# Patient Record
Sex: MALE | Race: WHITE | NOT HISPANIC OR LATINO | Employment: OTHER | URBAN - METROPOLITAN AREA
[De-identification: names, ages, dates, MRNs, and addresses within clinical notes are randomized per-mention and may not be internally consistent; named-entity substitution may affect disease eponyms.]

---

## 2023-05-19 LAB
ANION GAP IN SER/PLAS: 11 MMOL/L (ref 10–20)
CALCIUM (MG/DL) IN SER/PLAS: 8.8 MG/DL (ref 8.6–10.3)
CARBON DIOXIDE, TOTAL (MMOL/L) IN SER/PLAS: 28 MMOL/L (ref 21–32)
CHLORIDE (MMOL/L) IN SER/PLAS: 105 MMOL/L (ref 98–107)
CREATININE (MG/DL) IN SER/PLAS: 0.95 MG/DL (ref 0.5–1.3)
GFR MALE: 86 ML/MIN/1.73M2
GLUCOSE (MG/DL) IN SER/PLAS: 103 MG/DL (ref 74–99)
MAGNESIUM (MG/DL) IN SER/PLAS: 2.04 MG/DL (ref 1.6–2.4)
POTASSIUM (MMOL/L) IN SER/PLAS: 4.4 MMOL/L (ref 3.5–5.3)
SODIUM (MMOL/L) IN SER/PLAS: 140 MMOL/L (ref 136–145)
UREA NITROGEN (MG/DL) IN SER/PLAS: 11 MG/DL (ref 6–23)

## 2023-06-08 ENCOUNTER — APPOINTMENT (OUTPATIENT)
Dept: PRIMARY CARE | Facility: CLINIC | Age: 73
End: 2023-06-08
Payer: MEDICARE

## 2023-06-21 ENCOUNTER — OFFICE VISIT (OUTPATIENT)
Dept: PRIMARY CARE | Facility: CLINIC | Age: 73
End: 2023-06-21
Payer: MEDICARE

## 2023-06-21 VITALS
BODY MASS INDEX: 30.75 KG/M2 | DIASTOLIC BLOOD PRESSURE: 74 MMHG | WEIGHT: 246 LBS | HEART RATE: 73 BPM | OXYGEN SATURATION: 95 % | SYSTOLIC BLOOD PRESSURE: 122 MMHG

## 2023-06-21 DIAGNOSIS — I48.20 CHRONIC ATRIAL FIBRILLATION, UNSPECIFIED (MULTI): ICD-10-CM

## 2023-06-21 DIAGNOSIS — Z12.12 SCREENING FOR COLORECTAL CANCER: ICD-10-CM

## 2023-06-21 DIAGNOSIS — Z12.11 SCREENING FOR COLORECTAL CANCER: ICD-10-CM

## 2023-06-21 DIAGNOSIS — I10 PRIMARY HYPERTENSION: ICD-10-CM

## 2023-06-21 DIAGNOSIS — I20.89 OTHER FORMS OF ANGINA PECTORIS (CMS-HCC): ICD-10-CM

## 2023-06-21 DIAGNOSIS — Z00.00 ROUTINE GENERAL MEDICAL EXAMINATION AT HEALTH CARE FACILITY: ICD-10-CM

## 2023-06-21 DIAGNOSIS — I50.32 CHRONIC DIASTOLIC (CONGESTIVE) HEART FAILURE (MULTI): ICD-10-CM

## 2023-06-21 DIAGNOSIS — Z00.00 MEDICARE ANNUAL WELLNESS VISIT, SUBSEQUENT: Primary | ICD-10-CM

## 2023-06-21 PROBLEM — I48.91 ATRIAL FIBRILLATION (MULTI): Status: ACTIVE | Noted: 2023-06-21

## 2023-06-21 PROBLEM — N42.82 PROSTATITIS SYNDROME: Status: ACTIVE | Noted: 2023-06-21

## 2023-06-21 PROBLEM — N28.1 RENAL CYST, ACQUIRED, LEFT: Status: ACTIVE | Noted: 2023-06-21

## 2023-06-21 PROBLEM — N40.0 ENLARGED PROSTATE WITHOUT LOWER URINARY TRACT SYMPTOMS (LUTS): Status: ACTIVE | Noted: 2023-06-21

## 2023-06-21 PROCEDURE — 1160F RVW MEDS BY RX/DR IN RCRD: CPT | Performed by: NURSE PRACTITIONER

## 2023-06-21 PROCEDURE — 3074F SYST BP LT 130 MM HG: CPT | Performed by: NURSE PRACTITIONER

## 2023-06-21 PROCEDURE — 99213 OFFICE O/P EST LOW 20 MIN: CPT | Performed by: NURSE PRACTITIONER

## 2023-06-21 PROCEDURE — 1036F TOBACCO NON-USER: CPT | Performed by: NURSE PRACTITIONER

## 2023-06-21 PROCEDURE — 1159F MED LIST DOCD IN RCRD: CPT | Performed by: NURSE PRACTITIONER

## 2023-06-21 PROCEDURE — 3078F DIAST BP <80 MM HG: CPT | Performed by: NURSE PRACTITIONER

## 2023-06-21 PROCEDURE — G0439 PPPS, SUBSEQ VISIT: HCPCS | Performed by: NURSE PRACTITIONER

## 2023-06-21 PROCEDURE — 1170F FXNL STATUS ASSESSED: CPT | Performed by: NURSE PRACTITIONER

## 2023-06-21 RX ORDER — DOFETILIDE 0.5 MG/1
CAPSULE ORAL
COMMUNITY
End: 2024-04-09 | Stop reason: HOSPADM

## 2023-06-21 RX ORDER — CARVEDILOL 25 MG/1
TABLET ORAL
COMMUNITY
End: 2024-04-09 | Stop reason: HOSPADM

## 2023-06-21 RX ORDER — AMLODIPINE BESYLATE 10 MG/1
TABLET ORAL
COMMUNITY
Start: 2023-06-19 | End: 2024-01-29 | Stop reason: SDUPTHER

## 2023-06-21 RX ORDER — SACUBITRIL AND VALSARTAN 97; 103 MG/1; MG/1
1 TABLET, FILM COATED ORAL 2 TIMES DAILY
COMMUNITY
Start: 2021-12-07

## 2023-06-21 RX ORDER — TAMSULOSIN HYDROCHLORIDE 0.4 MG/1
CAPSULE ORAL
COMMUNITY
Start: 2012-09-25 | End: 2023-11-27

## 2023-06-21 RX ORDER — APIXABAN 5 MG/1
1 TABLET, FILM COATED ORAL 2 TIMES DAILY
COMMUNITY
Start: 2015-09-08 | End: 2024-01-15 | Stop reason: SDUPTHER

## 2023-06-21 ASSESSMENT — ENCOUNTER SYMPTOMS
DEPRESSION: 0
LOSS OF SENSATION IN FEET: 0
OCCASIONAL FEELINGS OF UNSTEADINESS: 0

## 2023-06-21 ASSESSMENT — ACTIVITIES OF DAILY LIVING (ADL)
DRESSING: INDEPENDENT
GROCERY_SHOPPING: INDEPENDENT
MANAGING_FINANCES: INDEPENDENT
TAKING_MEDICATION: INDEPENDENT
BATHING: INDEPENDENT
DOING_HOUSEWORK: INDEPENDENT

## 2023-06-21 ASSESSMENT — PATIENT HEALTH QUESTIONNAIRE - PHQ9
SUM OF ALL RESPONSES TO PHQ9 QUESTIONS 1 AND 2: 0
2. FEELING DOWN, DEPRESSED OR HOPELESS: NOT AT ALL
1. LITTLE INTEREST OR PLEASURE IN DOING THINGS: NOT AT ALL

## 2023-06-21 NOTE — ASSESSMENT & PLAN NOTE
Dx'd 8/2021  Ablation 2021  Seeing electrophys, Dr. Peterson  Cardiology, Dr. Willams  Rx Amlodipine, Coreg, Tikosyn, Eliquis, Entresto

## 2023-06-21 NOTE — PATIENT INSTRUCTIONS
Thank you for seeing me today.  It was a pleasure to see you again!    Today we did your Annual Medicare Wellness Exam and discussed the following:         #HTN/AFIB  c/w Eliquis, Carvedilol and Dofetilide, Amlodipine and Entresto per cardiology  See cardiology as recommended     #BPH  c/w Flomax 0.4 mg daily     #HM  See dentist and eye doctor     Schedule colonoscopy     RTC 6 months and as needed

## 2023-06-21 NOTE — PROGRESS NOTES
Subjective   Reason for Visit: Alcides Tovar is an 73 y.o. male here for a Medicare Wellness visit.     Past Medical, Surgical, and Family History reviewed and updated in chart.    Reviewed all medications by prescribing practitioner or clinical pharmacist (such as prescriptions, OTCs, herbal therapies and supplements) and documented in the medical record.    HPI  Est 74 yo male presents for 6 mo f/u and AWV  LOV Dec 2022     #HTN/AFIB  seeing cardiology, Dr. Willams   Electrophys: Dr. Peterson (ablation)  Rx Eliquis, Carvedilol 25 mg BID, Amlodipine, Dofetilide and Entresto BID   denies CP/SOB  had ablation in Aug 2021  BP today= 122/74  LAST ECHO: 2022     #BPH  Dx'd 10 yrs ago   Taking Flomax 0.4 mg daily  sx: controlled      #HM  FBW: Done Dec 2022  CT cardiac score= 0  ECHO: 2022   PSA: 2022  COLON: due; pt instructed to call     Patient Care Team:  CLAUDIA Sneed-CNP as PCP - General (Family Medicine)  Rayray Peterson MD as PCP - [employer]age PCP       Review of Systems  All 13 systems were reviewed and are within normal limits except positive and pertinent negative responses which are noted below or in HPI.      Objective   Vitals:  /74   Pulse 73   Wt 112 kg (246 lb)   SpO2 95%   BMI 30.75 kg/m²       Physical Exam  Vitals reviewed.   HENT:      Mouth/Throat:      Mouth: Mucous membranes are moist.   Cardiovascular:      Pulses: Normal pulses.   Pulmonary:      Effort: Pulmonary effort is normal.      Breath sounds: Normal breath sounds.   Abdominal:      General: Bowel sounds are normal.   Skin:     General: Skin is warm and dry.      Capillary Refill: Capillary refill takes less than 2 seconds.   Neurological:      Mental Status: He is alert.   Psychiatric:         Mood and Affect: Mood normal.         Assessment/Plan     Problem List Items Addressed This Visit       Hypertension    Other forms of angina pectoris (CMS/HCC)    Relevant Medications    amLODIPine (Norvasc) 10  mg tablet    Entresto  mg tablet    carvedilol (Coreg) 25 mg tablet     Other Visit Diagnoses       Medicare annual wellness visit, subsequent    -  Primary    Screening for colorectal cancer        Relevant Orders    Colonoscopy    Routine general medical examination at health care facility        Chronic diastolic (congestive) heart failure (CMS/HCC)        Relevant Medications    amLODIPine (Norvasc) 10 mg tablet    Entresto  mg tablet    carvedilol (Coreg) 25 mg tablet    Chronic atrial fibrillation, unspecified (CMS/HCC)        Relevant Medications    amLODIPine (Norvasc) 10 mg tablet    Entresto  mg tablet    carvedilol (Coreg) 25 mg tablet

## 2023-09-19 LAB
ANION GAP IN SER/PLAS: 10 MMOL/L (ref 10–20)
CALCIUM (MG/DL) IN SER/PLAS: 8.9 MG/DL (ref 8.6–10.3)
CARBON DIOXIDE, TOTAL (MMOL/L) IN SER/PLAS: 28 MMOL/L (ref 21–32)
CHLORIDE (MMOL/L) IN SER/PLAS: 104 MMOL/L (ref 98–107)
CREATININE (MG/DL) IN SER/PLAS: 0.94 MG/DL (ref 0.5–1.3)
GFR MALE: 86 ML/MIN/1.73M2
GLUCOSE (MG/DL) IN SER/PLAS: 106 MG/DL (ref 74–99)
MAGNESIUM (MG/DL) IN SER/PLAS: 2.18 MG/DL (ref 1.6–2.4)
POTASSIUM (MMOL/L) IN SER/PLAS: 4 MMOL/L (ref 3.5–5.3)
SODIUM (MMOL/L) IN SER/PLAS: 138 MMOL/L (ref 136–145)
UREA NITROGEN (MG/DL) IN SER/PLAS: 12 MG/DL (ref 6–23)

## 2023-11-27 DIAGNOSIS — N40.0 ENLARGED PROSTATE WITHOUT LOWER URINARY TRACT SYMPTOMS (LUTS): Primary | ICD-10-CM

## 2023-11-27 RX ORDER — TAMSULOSIN HYDROCHLORIDE 0.4 MG/1
CAPSULE ORAL
Qty: 90 CAPSULE | Refills: 3 | Status: SHIPPED | OUTPATIENT
Start: 2023-11-27

## 2023-12-05 ENCOUNTER — APPOINTMENT (OUTPATIENT)
Dept: PRIMARY CARE | Facility: CLINIC | Age: 73
End: 2023-12-05
Payer: MEDICARE

## 2024-01-03 NOTE — PROGRESS NOTES
"Subjective   Reason for Visit: Alcides Tovar is an 73 y.o. male here for 6 month f/u and a Medicare Wellness visit.     Past Medical, Surgical, and Family History reviewed and updated in chart.    Reviewed all medications by prescribing practitioner or clinical pharmacist (such as prescriptions, OTCs, herbal therapies and supplements) and documented in the medical record.    HPI    Alcides is a 74 yo male presenting today for 6 month f/u and AWV    PMH: HTN/Afib, BPH    Pt states he tested + for Covid in Nov 2023  Did okay, mostly \"head/sinus symptoms\"   Did not take Paxlovid     Pt denies any acute c/o today  Feeling well  Denies CP/SOB/palpitations     #HTN/AFIB  seeing cardiology, Dr. Willams--> has appt next week  Electrophys: Dr. Peterson (ablation)  Rx Eliquis, Carvedilol 25 mg BID, Amlodipine, Dofetilide and Entresto BID   had ablation in Aug 2021  BP today= 140/80  LAST ECHO: 2022     #BPH  Dx'd 10 yrs ago   Taking Flomax 0.4 mg daily  sx: controlled      #HM  FBW: DUE   PSA: 2022  COLON: due/ordered;  pt instructed to call, scheduled for March 2024      Patient Care Team:  IHSAN Sneed as PCP - General (Family Medicine)  Rayray Peterson MD as PCP - [employer]vantage PCP  IHSAN Sneed       Review of Systems  All 13 systems were reviewed and are within normal limits except positive and pertinent negative responses which are noted below or in HPI.      Objective   Vitals:  /80   Pulse 72   Ht 1.905 m (6' 3\")   Wt 109 kg (240 lb)   SpO2 95%   BMI 30.00 kg/m²       Current Outpatient Medications   Medication Instructions    amLODIPine (Norvasc) 10 mg tablet     carvedilol (Coreg) 25 mg tablet take 1 tablet by mouth twice a day 90    dofetilide (Tikosyn) 500 mcg capsule take 1 capsule by mouth twice a day 90    Eliquis 5 mg tablet 1 tablet, oral, 2 times daily    Entresto  mg tablet 1 tablet, oral, 2 times daily    tamsulosin (Flomax) 0.4 mg 24 hr capsule TAKE 1 " CAPSULE DAILY, ONE-HALF HOUR FOLLOWING THE SAME MEAL DAILY         Physical Exam  Vitals reviewed.   Cardiovascular:      Rate and Rhythm: Normal rate and regular rhythm.      Pulses: Normal pulses.   Pulmonary:      Effort: Pulmonary effort is normal.      Breath sounds: Normal breath sounds.   Skin:     General: Skin is warm and dry.   Neurological:      Mental Status: He is alert.   Psychiatric:         Mood and Affect: Mood normal.         Thought Content: Thought content normal.         Judgment: Judgment normal.         Assessment/Plan     Problem List Items Addressed This Visit             ICD-10-CM    Atrial fibrillation (CMS/HCC) I48.91    Chronic diastolic congestive heart failure (CMS/HCC) I50.32    Hypertension I10    Relevant Orders    Lipid Panel    Comprehensive Metabolic Panel     Other Visit Diagnoses         Codes    Medicare annual wellness visit, subsequent    -  Primary Z00.00    Routine general medical examination at health care facility     Z00.00    Chronic diastolic (congestive) heart failure (CMS/HCC)     I50.32    Relevant Orders    CBC    Chronic atrial fibrillation, unspecified (CMS/HCC)     I48.20

## 2024-01-04 ENCOUNTER — OFFICE VISIT (OUTPATIENT)
Dept: PRIMARY CARE | Facility: CLINIC | Age: 74
End: 2024-01-04
Payer: MEDICARE

## 2024-01-04 VITALS
BODY MASS INDEX: 29.84 KG/M2 | OXYGEN SATURATION: 95 % | HEIGHT: 75 IN | WEIGHT: 240 LBS | SYSTOLIC BLOOD PRESSURE: 140 MMHG | HEART RATE: 72 BPM | DIASTOLIC BLOOD PRESSURE: 80 MMHG

## 2024-01-04 DIAGNOSIS — I50.32 CHRONIC DIASTOLIC (CONGESTIVE) HEART FAILURE (MULTI): ICD-10-CM

## 2024-01-04 DIAGNOSIS — I48.20 CHRONIC ATRIAL FIBRILLATION, UNSPECIFIED (MULTI): ICD-10-CM

## 2024-01-04 DIAGNOSIS — I10 PRIMARY HYPERTENSION: ICD-10-CM

## 2024-01-04 DIAGNOSIS — Z00.00 ROUTINE GENERAL MEDICAL EXAMINATION AT HEALTH CARE FACILITY: ICD-10-CM

## 2024-01-04 DIAGNOSIS — I48.20 CHRONIC ATRIAL FIBRILLATION (MULTI): ICD-10-CM

## 2024-01-04 DIAGNOSIS — Z00.00 MEDICARE ANNUAL WELLNESS VISIT, SUBSEQUENT: Primary | ICD-10-CM

## 2024-01-04 DIAGNOSIS — I50.32 CHRONIC DIASTOLIC CONGESTIVE HEART FAILURE (MULTI): ICD-10-CM

## 2024-01-04 PROCEDURE — 3077F SYST BP >= 140 MM HG: CPT | Performed by: NURSE PRACTITIONER

## 2024-01-04 PROCEDURE — 1170F FXNL STATUS ASSESSED: CPT | Performed by: NURSE PRACTITIONER

## 2024-01-04 PROCEDURE — G0439 PPPS, SUBSEQ VISIT: HCPCS | Performed by: NURSE PRACTITIONER

## 2024-01-04 PROCEDURE — 3079F DIAST BP 80-89 MM HG: CPT | Performed by: NURSE PRACTITIONER

## 2024-01-04 PROCEDURE — 1160F RVW MEDS BY RX/DR IN RCRD: CPT | Performed by: NURSE PRACTITIONER

## 2024-01-04 PROCEDURE — 99212 OFFICE O/P EST SF 10 MIN: CPT | Performed by: NURSE PRACTITIONER

## 2024-01-04 PROCEDURE — 1159F MED LIST DOCD IN RCRD: CPT | Performed by: NURSE PRACTITIONER

## 2024-01-04 PROCEDURE — 1036F TOBACCO NON-USER: CPT | Performed by: NURSE PRACTITIONER

## 2024-01-04 ASSESSMENT — ENCOUNTER SYMPTOMS
DEPRESSION: 0
OCCASIONAL FEELINGS OF UNSTEADINESS: 0
LOSS OF SENSATION IN FEET: 0

## 2024-01-04 ASSESSMENT — ACTIVITIES OF DAILY LIVING (ADL)
DRESSING: INDEPENDENT
GROCERY_SHOPPING: INDEPENDENT
TAKING_MEDICATION: INDEPENDENT
MANAGING_FINANCES: INDEPENDENT
BATHING: INDEPENDENT
DOING_HOUSEWORK: INDEPENDENT

## 2024-01-04 NOTE — PATIENT INSTRUCTIONS
Thank you for seeing me today.  It was a pleasure to see you again!    Today we did your Annual Medicare Wellness Exam and discussed the following:     Continue medications    See cardiology as scheduled     Lab work ordered today.  Please have your blood drawn in the next 1-2 weeks.  You need to be FASTING for 12 hours prior to blood draw.  You may only have water.  Please contact your insurance company to ask about the best location to get blood drawn.  We will contact you with the results of your blood work and any necessary adjustments  to your plan of care, if you do not hear from us within 3-5 days of having your blood drawn, please call the office at [phone].    RTC 6 MONTHS AND AS NEEDED

## 2024-01-15 ENCOUNTER — OFFICE VISIT (OUTPATIENT)
Dept: CARDIOLOGY | Facility: CLINIC | Age: 74
End: 2024-01-15
Payer: MEDICARE

## 2024-01-15 VITALS
WEIGHT: 233.4 LBS | BODY MASS INDEX: 29.17 KG/M2 | SYSTOLIC BLOOD PRESSURE: 112 MMHG | DIASTOLIC BLOOD PRESSURE: 62 MMHG | HEART RATE: 73 BPM

## 2024-01-15 DIAGNOSIS — I48.0 PAROXYSMAL ATRIAL FIBRILLATION (MULTI): ICD-10-CM

## 2024-01-15 PROCEDURE — 3078F DIAST BP <80 MM HG: CPT

## 2024-01-15 PROCEDURE — 1159F MED LIST DOCD IN RCRD: CPT

## 2024-01-15 PROCEDURE — 1036F TOBACCO NON-USER: CPT

## 2024-01-15 PROCEDURE — 1126F AMNT PAIN NOTED NONE PRSNT: CPT

## 2024-01-15 PROCEDURE — 93005 ELECTROCARDIOGRAM TRACING: CPT

## 2024-01-15 PROCEDURE — 99213 OFFICE O/P EST LOW 20 MIN: CPT

## 2024-01-15 PROCEDURE — 3074F SYST BP LT 130 MM HG: CPT

## 2024-01-15 RX ORDER — APIXABAN 5 MG/1
5 TABLET, FILM COATED ORAL 2 TIMES DAILY
Qty: 180 TABLET | Refills: 1 | Status: SHIPPED | OUTPATIENT
Start: 2024-01-15 | End: 2024-07-13

## 2024-01-15 ASSESSMENT — PAIN SCALES - GENERAL: PAINLEVEL: 0-NO PAIN

## 2024-01-15 NOTE — PROGRESS NOTES
Chief Complaint:   Atrial Fibrillation (Tikosyn f/u)     History Of Present Illness:    Alcides Tovar is a 73 y.o. male with a past medical history of hypertension and paroxysmal atrial fibrillation post catheter-based therapy in August 2021.  Patient has been maintained on dofetilide and apixaban with no recurrence of atrial fibrillation to his knowledge.  Echocardiogram from May 2022 revealed a normal left ventricular systolic function at 55% with no significant valvular abnormalities.  He is compliant with his medications.     Last Recorded Vitals:  Vitals:    01/15/24 0853   BP: 112/62   Pulse: 73   Weight: 106 kg (233 lb 6.4 oz)       Past Medical History:  He has a past medical history of Personal history of other diseases of the circulatory system (12/08/2022) and Personal history of other endocrine, nutritional and metabolic disease (11/17/2019).    Past Surgical History:  He has a past surgical history that includes Other surgical history (05/29/2013) and Colonoscopy (05/29/2013).      Social History:  He reports that he has never smoked. He has never been exposed to tobacco smoke. He has never used smokeless tobacco. He reports current alcohol use. He reports that he does not use drugs.    Family History:  No family history on file.     Allergies:  Patient has no known allergies.    Outpatient Medications:  Current Outpatient Medications   Medication Instructions    amLODIPine (Norvasc) 10 mg tablet     carvedilol (Coreg) 25 mg tablet take 1 tablet by mouth twice a day 90    dofetilide (Tikosyn) 500 mcg capsule take 1 capsule by mouth twice a day 90    Eliquis 5 mg, oral, 2 times daily    Entresto  mg tablet 1 tablet, oral, 2 times daily    tamsulosin (Flomax) 0.4 mg 24 hr capsule TAKE 1 CAPSULE DAILY, ONE-HALF HOUR FOLLOWING THE SAME MEAL DAILY       Physical Exam:  Physical Exam  Vitals and nursing note reviewed.   Constitutional:       Appearance: Normal appearance.   Cardiovascular:      Rate and  "Rhythm: Normal rate and regular rhythm.      Pulses: Normal pulses.      Heart sounds: Normal heart sounds.   Pulmonary:      Effort: Pulmonary effort is normal.      Breath sounds: Normal breath sounds.   Abdominal:      General: Bowel sounds are normal.      Palpations: Abdomen is soft.   Musculoskeletal:         General: Normal range of motion.   Skin:     General: Skin is warm and dry.   Neurological:      General: No focal deficit present.      Mental Status: He is alert and oriented to person, place, and time.      Review of Systems   All other systems reviewed and are negative.      Last Labs:  CBC -  Lab Results   Component Value Date    WBC 7.0 05/08/2022    HGB 14.3 05/08/2022    HCT 42.6 05/08/2022    MCV 89.9 05/08/2022     05/08/2022       CMP -  Lab Results   Component Value Date    CALCIUM 8.9 09/19/2023    PHOS 2.5 12/21/2018    PROT 6.3 (L) 12/17/2021    ALBUMIN 4.1 12/17/2021    ALBUMIN 3.7 12/21/2018    AST 13 12/17/2021    ALT 15 12/17/2021    ALKPHOS 51 12/17/2021    BILITOT 0.5 12/17/2021       LIPID PANEL -   Lab Results   Component Value Date    CHOL 162 01/13/2023    TRIG 56 01/13/2023    HDL 57.4 01/13/2023    CHHDL 2.8 01/13/2023    LDLF 93 01/13/2023    VLDL 11 01/13/2023       RENAL FUNCTION PANEL -   Lab Results   Component Value Date    GLUCOSE 106 (H) 09/19/2023     09/19/2023    K 4.0 09/19/2023     09/19/2023    CO2 28 09/19/2023    ANIONGAP 10 09/19/2023    BUN 12 09/19/2023    CREATININE 0.94 09/19/2023    GFRMALE 86 09/19/2023    CALCIUM 8.9 09/19/2023    PHOS 2.5 12/21/2018    ALBUMIN 4.1 12/17/2021    ALBUMIN 3.7 12/21/2018        No results found for: \"BNP\", \"HGBA1C\"    Last Cardiology Tests:  ECG:  Normal sinus rhythm at 72 bpm.  NH interval 200 ms, QRS duration 96 ms, QTc 453 ms    Assessment/Plan   Diagnoses and all orders for this visit:  Paroxysmal atrial fibrillation (CMS/HCC)  -     ECG 12 lead (Clinic Performed)  -     Eliquis 5 mg tablet; Take 1 " tablet (5 mg) by mouth 2 times a day.  -     Magnesium; Future    History as above. Continue current regimen.     Olamide Anthony, APRN-CNP

## 2024-01-25 ENCOUNTER — LAB (OUTPATIENT)
Dept: LAB | Facility: LAB | Age: 74
End: 2024-01-25
Payer: MEDICARE

## 2024-01-25 DIAGNOSIS — I10 PRIMARY HYPERTENSION: ICD-10-CM

## 2024-01-25 DIAGNOSIS — I50.32 CHRONIC DIASTOLIC (CONGESTIVE) HEART FAILURE (MULTI): ICD-10-CM

## 2024-01-25 DIAGNOSIS — I48.0 PAROXYSMAL ATRIAL FIBRILLATION (MULTI): ICD-10-CM

## 2024-01-25 LAB
ALBUMIN SERPL BCP-MCNC: 4 G/DL (ref 3.4–5)
ALP SERPL-CCNC: 47 U/L (ref 33–136)
ALT SERPL W P-5'-P-CCNC: 13 U/L (ref 10–52)
ANION GAP SERPL CALC-SCNC: 11 MMOL/L (ref 10–20)
AST SERPL W P-5'-P-CCNC: 13 U/L (ref 9–39)
BILIRUB SERPL-MCNC: 0.5 MG/DL (ref 0–1.2)
BUN SERPL-MCNC: 13 MG/DL (ref 6–23)
CALCIUM SERPL-MCNC: 8.6 MG/DL (ref 8.6–10.3)
CHLORIDE SERPL-SCNC: 106 MMOL/L (ref 98–107)
CHOLEST SERPL-MCNC: 144 MG/DL (ref 0–199)
CHOLESTEROL/HDL RATIO: 2.5
CO2 SERPL-SCNC: 26 MMOL/L (ref 21–32)
CREAT SERPL-MCNC: 0.93 MG/DL (ref 0.5–1.3)
EGFRCR SERPLBLD CKD-EPI 2021: 88 ML/MIN/1.73M*2
ERYTHROCYTE [DISTWIDTH] IN BLOOD BY AUTOMATED COUNT: 12.8 % (ref 11.5–14.5)
GLUCOSE SERPL-MCNC: 98 MG/DL (ref 74–99)
HCT VFR BLD AUTO: 40.1 % (ref 41–52)
HDLC SERPL-MCNC: 56.8 MG/DL
HGB BLD-MCNC: 13.1 G/DL (ref 13.5–17.5)
LDLC SERPL CALC-MCNC: 79 MG/DL
MAGNESIUM SERPL-MCNC: 2.2 MG/DL (ref 1.6–2.4)
MCH RBC QN AUTO: 29 PG (ref 26–34)
MCHC RBC AUTO-ENTMCNC: 32.7 G/DL (ref 32–36)
MCV RBC AUTO: 89 FL (ref 80–100)
NON HDL CHOLESTEROL: 87 MG/DL (ref 0–149)
NRBC BLD-RTO: 0 /100 WBCS (ref 0–0)
PLATELET # BLD AUTO: 229 X10*3/UL (ref 150–450)
POTASSIUM SERPL-SCNC: 4.4 MMOL/L (ref 3.5–5.3)
PROT SERPL-MCNC: 5.8 G/DL (ref 6.4–8.2)
RBC # BLD AUTO: 4.52 X10*6/UL (ref 4.5–5.9)
SODIUM SERPL-SCNC: 139 MMOL/L (ref 136–145)
TRIGL SERPL-MCNC: 43 MG/DL (ref 0–149)
VLDL: 9 MG/DL (ref 0–40)
WBC # BLD AUTO: 4.8 X10*3/UL (ref 4.4–11.3)

## 2024-01-25 PROCEDURE — 80053 COMPREHEN METABOLIC PANEL: CPT

## 2024-01-25 PROCEDURE — 36415 COLL VENOUS BLD VENIPUNCTURE: CPT

## 2024-01-25 PROCEDURE — 80061 LIPID PANEL: CPT

## 2024-01-25 PROCEDURE — 83735 ASSAY OF MAGNESIUM: CPT

## 2024-01-25 PROCEDURE — 85027 COMPLETE CBC AUTOMATED: CPT

## 2024-01-25 NOTE — RESULT ENCOUNTER NOTE
Alcides Tovar    I have reviewed all of your blood work and it looks fine. Your protein was a bit low, you should try increasing your daily intake.     No changes to your plan of care as we discussed in the office.     If you have any questions, please feel free to call my office.    Take Care,   Jerilyn

## 2024-01-29 DIAGNOSIS — I10 PRIMARY HYPERTENSION: Primary | ICD-10-CM

## 2024-01-29 NOTE — TELEPHONE ENCOUNTER
Pt called in to request a refill       Requested Prescriptions     Pending Prescriptions Disp Refills    amLODIPine (Norvasc) 10 mg tablet 90 tablet 3     Sig: Take 1 tablet (10 mg) by mouth once daily.

## 2024-01-30 PROBLEM — I42.9 CARDIOMYOPATHY (MULTI): Status: ACTIVE | Noted: 2024-01-30

## 2024-01-30 PROBLEM — R55 SYNCOPE: Status: ACTIVE | Noted: 2024-01-30

## 2024-01-30 PROBLEM — R73.9 HYPERGLYCEMIA: Status: ACTIVE | Noted: 2024-01-30

## 2024-01-30 PROBLEM — M79.643 HAND PAIN: Status: ACTIVE | Noted: 2024-01-30

## 2024-01-30 PROBLEM — R00.2 PALPITATIONS: Status: ACTIVE | Noted: 2024-01-30

## 2024-01-30 PROBLEM — H61.20 EXCESSIVE CERUMEN IN EAR CANAL: Status: ACTIVE | Noted: 2024-01-30

## 2024-01-30 PROBLEM — E66.9 OBESITY WITH BODY MASS INDEX 30 OR GREATER: Status: ACTIVE | Noted: 2024-01-30

## 2024-01-30 RX ORDER — LISINOPRIL AND HYDROCHLOROTHIAZIDE 20; 25 MG/1; MG/1
TABLET ORAL
COMMUNITY
Start: 2015-10-20 | End: 2024-02-05 | Stop reason: ALTCHOICE

## 2024-01-30 RX ORDER — AMLODIPINE BESYLATE 10 MG/1
10 TABLET ORAL DAILY
Qty: 90 TABLET | Refills: 3 | Status: SHIPPED | OUTPATIENT
Start: 2024-01-30 | End: 2024-04-09 | Stop reason: HOSPADM

## 2024-01-30 RX ORDER — DILTIAZEM HYDROCHLORIDE 60 MG/1
TABLET, FILM COATED ORAL
COMMUNITY
Start: 2019-03-21 | End: 2024-02-05 | Stop reason: ALTCHOICE

## 2024-02-05 ENCOUNTER — OFFICE VISIT (OUTPATIENT)
Dept: CARDIOLOGY | Facility: CLINIC | Age: 74
End: 2024-02-05
Payer: MEDICARE

## 2024-02-05 VITALS
BODY MASS INDEX: 29.84 KG/M2 | DIASTOLIC BLOOD PRESSURE: 81 MMHG | WEIGHT: 240 LBS | TEMPERATURE: 98.6 F | SYSTOLIC BLOOD PRESSURE: 136 MMHG | HEIGHT: 75 IN | HEART RATE: 66 BPM | RESPIRATION RATE: 18 BRPM

## 2024-02-05 DIAGNOSIS — I48.0 PAROXYSMAL ATRIAL FIBRILLATION (MULTI): Primary | ICD-10-CM

## 2024-02-05 DIAGNOSIS — R00.2 PALPITATIONS: ICD-10-CM

## 2024-02-05 DIAGNOSIS — I10 PRIMARY HYPERTENSION: ICD-10-CM

## 2024-02-05 DIAGNOSIS — I42.0 DILATED CARDIOMYOPATHY (MULTI): ICD-10-CM

## 2024-02-05 PROCEDURE — 1036F TOBACCO NON-USER: CPT | Performed by: INTERNAL MEDICINE

## 2024-02-05 PROCEDURE — 1159F MED LIST DOCD IN RCRD: CPT | Performed by: INTERNAL MEDICINE

## 2024-02-05 PROCEDURE — 1160F RVW MEDS BY RX/DR IN RCRD: CPT | Performed by: INTERNAL MEDICINE

## 2024-02-05 PROCEDURE — 1123F ACP DISCUSS/DSCN MKR DOCD: CPT | Performed by: INTERNAL MEDICINE

## 2024-02-05 PROCEDURE — 99214 OFFICE O/P EST MOD 30 MIN: CPT | Performed by: INTERNAL MEDICINE

## 2024-02-05 PROCEDURE — 1126F AMNT PAIN NOTED NONE PRSNT: CPT | Performed by: INTERNAL MEDICINE

## 2024-02-05 PROCEDURE — 3075F SYST BP GE 130 - 139MM HG: CPT | Performed by: INTERNAL MEDICINE

## 2024-02-05 PROCEDURE — 3079F DIAST BP 80-89 MM HG: CPT | Performed by: INTERNAL MEDICINE

## 2024-02-05 ASSESSMENT — PAIN SCALES - GENERAL: PAINLEVEL: 0-NO PAIN

## 2024-02-05 NOTE — PROGRESS NOTES
History of present illness:  Patient returns today for follow-up had history of cardiomyopathy diagnosed recently ejection fraction of 35-40% atrial fibrillation status post ablation by Dr. Peterson currently on Tikosyn. Patient returns to the office for follow-up feeling overall good. Denies having chest pain no more palpitations. breathing issues patient returns for follow-up. Doing overall good. Ejection fraction in 2022 recovered completely.  Patient doing overall good.  Denies any chest pain palpitations dizziness or syncope.   Past Medical History:   Diagnosis Date    Atrial fibrillation (CMS/[facility]) 06/21/2023    Cardiomyopathy (CMS/[facility]) 01/30/2024    Chronic diastolic congestive heart failure (CMS/[facility]) 06/21/2023    Hypertension 06/21/2023    Other forms of angina pectoris 06/21/2023    Palpitations 01/30/2024    Personal history of other diseases of the circulatory system 12/08/2022    History of atrial fibrillation    Personal history of other endocrine, nutritional and metabolic disease 11/17/2019    History of hyperglycemia    Syncope 01/30/2024       Past Surgical History:   Procedure Laterality Date    COLONOSCOPY  05/29/2013    Complete Colonoscopy    OTHER SURGICAL HISTORY  05/29/2013    Stress Test ECG Performed       No Known Allergies     reports that he has never smoked. He has never been exposed to tobacco smoke. He has never used smokeless tobacco. He reports current alcohol use. He reports that he does not use drugs.    Family History   Problem Relation Name Age of Onset    Lymphoma Mother      Sudden death Father  44        heart attack       Patient's Medications   New Prescriptions    No medications on file   Previous Medications    AMLODIPINE (NORVASC) 10 MG TABLET    Take 1 tablet (10 mg) by mouth once daily.    CARVEDILOL (COREG) 25 MG TABLET    take 1 tablet by mouth twice a day 90    DOFETILIDE (TIKOSYN) 500 MCG CAPSULE    take 1 capsule by mouth twice a day 90    ELIQUIS 5 MG TABLET     Take 1 tablet (5 mg) by mouth 2 times a day.    ENTRESTO  MG TABLET    Take 1 tablet by mouth 2 times a day.    TAMSULOSIN (FLOMAX) 0.4 MG 24 HR CAPSULE    TAKE 1 CAPSULE DAILY, ONE-HALF HOUR FOLLOWING THE SAME MEAL DAILY   Modified Medications    No medications on file   Discontinued Medications    DILTIAZEM (CARDIZEM) 60 MG IMMEDIATE RELEASE TABLET    Take by mouth.    LISINOPRIL-HYDROCHLOROTHIAZIDE 20-25 MG TABLET    Take by mouth.       Objective   Physical Exam  General: Patient in no acute distress   HEENT: Atraumatic normocephalic.  Neck: Supple, jugular venous pressure within normal limit.  No bruits  Lungs: Clear to auscultation bilaterally  Cardiovascular: Regular rate and rhythm, normal heart sounds, no murmurs rubs or gallops  Abdomen: Soft nontender nondistended.  Normal bowel sounds.  Extremities: Warm to touch, no edema.        Lab Review   Lab on 01/25/2024   Component Date Value    Cholesterol 01/25/2024 144     HDL-Cholesterol 01/25/2024 56.8     Cholesterol/HDL Ratio 01/25/2024 2.5     LDL Calculated 01/25/2024 79     VLDL 01/25/2024 9     Triglycerides 01/25/2024 43     Non HDL Cholesterol 01/25/2024 87     Glucose 01/25/2024 98     Sodium 01/25/2024 139     Potassium 01/25/2024 4.4     Chloride 01/25/2024 106     Bicarbonate 01/25/2024 26     Anion Gap 01/25/2024 11     Urea Nitrogen 01/25/2024 13     Creatinine 01/25/2024 0.93     eGFR 01/25/2024 88     Calcium 01/25/2024 8.6     Albumin 01/25/2024 4.0     Alkaline Phosphatase 01/25/2024 47     Total Protein 01/25/2024 5.8 (L)     AST 01/25/2024 13     Bilirubin, Total 01/25/2024 0.5     ALT 01/25/2024 13     WBC 01/25/2024 4.8     nRBC 01/25/2024 0.0     RBC 01/25/2024 4.52     Hemoglobin 01/25/2024 13.1 (L)     Hematocrit 01/25/2024 40.1 (L)     MCV 01/25/2024 89     MCH 01/25/2024 29.0     MCHC 01/25/2024 32.7     RDW 01/25/2024 12.8     Platelets 01/25/2024 229     Magnesium 01/25/2024 2.20         Assessment/Plan   Patient Active  Problem List   Diagnosis    Atrial fibrillation (CMS/HCC)    Chronic diastolic congestive heart failure (CMS/HCC)    Enlarged prostate without lower urinary tract symptoms (luts)    Hypertension    Prostatitis syndrome    Renal cyst, acquired, left    Other forms of angina pectoris    Cardiomyopathy (CMS/HCC)    Excessive cerumen in ear canal    Hand pain    Hyperglycemia    Obesity with body mass index 30 or greater    Palpitations    Syncope      Patient returns today for follow-up had history of cardiomyopathy diagnosed recently ejection fraction of 35-40% atrial fibrillation status post ablation by Dr. Peterson currently on Tikosyn. Patient returns to the office for follow-up feeling overall good. Denies having chest pain no more palpitations. breathing issues patient returns for follow-up. Doing overall good. Ejection fraction in 2022 recovered completely.  Patient doing overall good.  Denies any chest pain palpitations dizziness or syncope.  Tolerating his pills good.  At home blood pressure well-controlled.  Recommended to patient plan modification.  Continue current medications.  Will follow-up in 6 months.      Cammie Willams MD

## 2024-03-13 ENCOUNTER — TELEPHONE (OUTPATIENT)
Dept: CARDIOLOGY | Facility: CLINIC | Age: 74
End: 2024-03-13
Payer: MEDICARE

## 2024-03-13 NOTE — TELEPHONE ENCOUNTER
Pt is in need of surgical clearance to have following procedure:    Colonoscopy is to be done in [facility] on 03.21.24    Provider contact information:     Dr. CHANTELLE Orozco  Office Fax [phone]

## 2024-03-18 NOTE — TELEPHONE ENCOUNTER
Patient's procedure is this coming Thursday on 3/21/24 and needs clearance. Patient states that Dr. Nick Orozco has not received a note yet.

## 2024-03-18 NOTE — TELEPHONE ENCOUNTER
Patient stable cardiac wise, continue beta-blocker or rate control medication.  Moderate risk for CV events during the surgery.  No contraindication for surgery.  Hold blood thinner 48 hours before surgery.  Please do not hesitate to contact my office at [phone] or [phone] cellphone with questions or concerns, or if you need further information.     Sincerely,   Zack Connelly MD

## 2024-03-20 ENCOUNTER — ANESTHESIA EVENT (OUTPATIENT)
Dept: OPERATING ROOM | Facility: HOSPITAL | Age: 74
End: 2024-03-20
Payer: MEDICARE

## 2024-03-20 RX ORDER — DROPERIDOL 2.5 MG/ML
0.62 INJECTION, SOLUTION INTRAMUSCULAR; INTRAVENOUS ONCE AS NEEDED
Status: CANCELLED | OUTPATIENT
Start: 2024-03-20

## 2024-03-20 RX ORDER — SODIUM CHLORIDE, SODIUM LACTATE, POTASSIUM CHLORIDE, CALCIUM CHLORIDE 600; 310; 30; 20 MG/100ML; MG/100ML; MG/100ML; MG/100ML
100 INJECTION, SOLUTION INTRAVENOUS CONTINUOUS
Status: CANCELLED | OUTPATIENT
Start: 2024-03-20

## 2024-03-20 RX ORDER — ONDANSETRON HYDROCHLORIDE 2 MG/ML
4 INJECTION, SOLUTION INTRAVENOUS ONCE AS NEEDED
Status: CANCELLED | OUTPATIENT
Start: 2024-03-20

## 2024-03-20 RX ORDER — OXYCODONE HYDROCHLORIDE 5 MG/1
5 TABLET ORAL EVERY 4 HOURS PRN
Status: CANCELLED | OUTPATIENT
Start: 2024-03-20

## 2024-03-21 ENCOUNTER — ANESTHESIA (OUTPATIENT)
Dept: OPERATING ROOM | Facility: HOSPITAL | Age: 74
End: 2024-03-21
Payer: MEDICARE

## 2024-03-21 ENCOUNTER — HOSPITAL ENCOUNTER (OUTPATIENT)
Dept: OPERATING ROOM | Facility: HOSPITAL | Age: 74
Setting detail: OUTPATIENT SURGERY
Discharge: HOME | End: 2024-03-21
Payer: MEDICARE

## 2024-03-21 VITALS
OXYGEN SATURATION: 97 % | DIASTOLIC BLOOD PRESSURE: 65 MMHG | WEIGHT: 233.69 LBS | HEART RATE: 82 BPM | HEIGHT: 75 IN | BODY MASS INDEX: 29.06 KG/M2 | RESPIRATION RATE: 16 BRPM | SYSTOLIC BLOOD PRESSURE: 108 MMHG | TEMPERATURE: 97.2 F

## 2024-03-21 DIAGNOSIS — Z12.12 SCREENING FOR COLORECTAL CANCER: ICD-10-CM

## 2024-03-21 DIAGNOSIS — Z12.11 SCREENING FOR COLORECTAL CANCER: ICD-10-CM

## 2024-03-21 PROCEDURE — 7100000010 HC PHASE TWO TIME - EACH INCREMENTAL 1 MINUTE: Performed by: NURSE ANESTHETIST, CERTIFIED REGISTERED

## 2024-03-21 PROCEDURE — 3700000002 HC GENERAL ANESTHESIA TIME - EACH INCREMENTAL 1 MINUTE: Performed by: NURSE ANESTHETIST, CERTIFIED REGISTERED

## 2024-03-21 PROCEDURE — 2500000005 HC RX 250 GENERAL PHARMACY W/O HCPCS: Performed by: NURSE ANESTHETIST, CERTIFIED REGISTERED

## 2024-03-21 PROCEDURE — 7100000009 HC PHASE TWO TIME - INITIAL BASE CHARGE: Performed by: NURSE ANESTHETIST, CERTIFIED REGISTERED

## 2024-03-21 PROCEDURE — 45380 COLONOSCOPY AND BIOPSY: CPT | Performed by: SURGERY

## 2024-03-21 PROCEDURE — 3700000001 HC GENERAL ANESTHESIA TIME - INITIAL BASE CHARGE: Performed by: NURSE ANESTHETIST, CERTIFIED REGISTERED

## 2024-03-21 PROCEDURE — A45378 PR COLONOSCOPY,DIAGNOSTIC: Performed by: NURSE ANESTHETIST, CERTIFIED REGISTERED

## 2024-03-21 PROCEDURE — 2500000004 HC RX 250 GENERAL PHARMACY W/ HCPCS (ALT 636 FOR OP/ED): Performed by: NURSE ANESTHETIST, CERTIFIED REGISTERED

## 2024-03-21 PROCEDURE — 3600000002 HC OR TIME - INITIAL BASE CHARGE - PROCEDURE LEVEL TWO: Performed by: NURSE ANESTHETIST, CERTIFIED REGISTERED

## 2024-03-21 PROCEDURE — 3600000007 HC OR TIME - EACH INCREMENTAL 1 MINUTE - PROCEDURE LEVEL TWO: Performed by: NURSE ANESTHETIST, CERTIFIED REGISTERED

## 2024-03-21 PROCEDURE — 88305 TISSUE EXAM BY PATHOLOGIST: CPT | Performed by: PATHOLOGY

## 2024-03-21 PROCEDURE — 0753T DGTZ GLS MCRSCP SLD LEVEL IV: CPT | Mod: TC,GEALAB | Performed by: SURGERY

## 2024-03-21 PROCEDURE — 2500000004 HC RX 250 GENERAL PHARMACY W/ HCPCS (ALT 636 FOR OP/ED): Performed by: ANESTHESIOLOGY

## 2024-03-21 RX ORDER — FENTANYL CITRATE 50 UG/ML
INJECTION, SOLUTION INTRAMUSCULAR; INTRAVENOUS CONTINUOUS PRN
Status: DISCONTINUED | OUTPATIENT
Start: 2024-03-21 | End: 2024-03-21

## 2024-03-21 RX ORDER — PROPOFOL 10 MG/ML
INJECTION, EMULSION INTRAVENOUS CONTINUOUS PRN
Status: DISCONTINUED | OUTPATIENT
Start: 2024-03-21 | End: 2024-03-21

## 2024-03-21 RX ORDER — LIDOCAINE HCL/PF 100 MG/5ML
SYRINGE (ML) INTRAVENOUS AS NEEDED
Status: DISCONTINUED | OUTPATIENT
Start: 2024-03-21 | End: 2024-03-21

## 2024-03-21 RX ORDER — MIDAZOLAM HYDROCHLORIDE 1 MG/ML
INJECTION INTRAMUSCULAR; INTRAVENOUS AS NEEDED
Status: DISCONTINUED | OUTPATIENT
Start: 2024-03-21 | End: 2024-03-21

## 2024-03-21 RX ORDER — FENTANYL CITRATE 50 UG/ML
INJECTION, SOLUTION INTRAMUSCULAR; INTRAVENOUS AS NEEDED
Status: DISCONTINUED | OUTPATIENT
Start: 2024-03-21 | End: 2024-03-21

## 2024-03-21 RX ORDER — SODIUM CHLORIDE, SODIUM LACTATE, POTASSIUM CHLORIDE, CALCIUM CHLORIDE 600; 310; 30; 20 MG/100ML; MG/100ML; MG/100ML; MG/100ML
100 INJECTION, SOLUTION INTRAVENOUS CONTINUOUS
Status: DISCONTINUED | OUTPATIENT
Start: 2024-03-21 | End: 2024-03-22 | Stop reason: HOSPADM

## 2024-03-21 RX ADMIN — PROPOFOL 140 MCG/KG/MIN: 10 INJECTION, EMULSION INTRAVENOUS at 15:08

## 2024-03-21 RX ADMIN — SODIUM CHLORIDE, POTASSIUM CHLORIDE, SODIUM LACTATE AND CALCIUM CHLORIDE 100 ML/HR: 600; 310; 30; 20 INJECTION, SOLUTION INTRAVENOUS at 13:46

## 2024-03-21 RX ADMIN — FENTANYL CITRATE 50 MCG: 50 INJECTION, SOLUTION INTRAMUSCULAR; INTRAVENOUS at 15:06

## 2024-03-21 RX ADMIN — LIDOCAINE HYDROCHLORIDE 80 MG: 20 INJECTION INTRAVENOUS at 15:08

## 2024-03-21 RX ADMIN — MIDAZOLAM HYDROCHLORIDE 1 MG: 1 INJECTION, SOLUTION INTRAMUSCULAR; INTRAVENOUS at 15:02

## 2024-03-21 SDOH — HEALTH STABILITY: MENTAL HEALTH: CURRENT SMOKER: 0

## 2024-03-21 ASSESSMENT — COLUMBIA-SUICIDE SEVERITY RATING SCALE - C-SSRS
6. HAVE YOU EVER DONE ANYTHING, STARTED TO DO ANYTHING, OR PREPARED TO DO ANYTHING TO END YOUR LIFE?: NO
1. IN THE PAST MONTH, HAVE YOU WISHED YOU WERE DEAD OR WISHED YOU COULD GO TO SLEEP AND NOT WAKE UP?: NO
2. HAVE YOU ACTUALLY HAD ANY THOUGHTS OF KILLING YOURSELF?: NO

## 2024-03-21 NOTE — ANESTHESIA PREPROCEDURE EVALUATION
Patient: Alcides Tovar    Procedure Information       Anesthesia Start Date/Time: 03/21/24 1504    Scheduled providers: Nick Orozco MD    Procedure: COLONOSCOPY    Location: [facility] OR            Relevant Problems   Cardiovascular   (+) Atrial fibrillation (CMS/HCC)   (+) Chronic diastolic congestive heart failure (CMS/HCC)   (+) Hypertension   (+) Other forms of angina pectoris      /Renal   (+) Renal cyst, acquired, left       Clinical information reviewed:   Tobacco  Allergies  Meds   Med Hx  Surg Hx   Fam Hx  Soc Hx        NPO Detail:  NPO/Void Status  Date of Last Liquid: 03/21/24  Time of Last Liquid: 1030  Date of Last Solid: 03/19/24         Physical Exam    Airway  Mallampati: II  TM distance: >3 FB  Neck ROM: full     Cardiovascular - normal exam     Dental    Pulmonary - normal exam     Abdominal        Vitals:    03/21/24 1314   BP: 153/81   Pulse: 74   Resp: 18   Temp: 36.2 °C (97.2 °F)   SpO2: 97%       Past Surgical History:   Procedure Laterality Date   • COLONOSCOPY  05/29/2013    Complete Colonoscopy   • OTHER SURGICAL HISTORY  05/29/2013    Stress Test ECG Performed     Past Medical History:   Diagnosis Date   • Atrial fibrillation (CMS/HCC) 06/21/2023    Hx of ablation   • BPH (benign prostatic hyperplasia)    • Cardiomyopathy (CMS/HCC) 01/30/2024    Cammie Willams MD  Physician : Cardiology   • Chronic anticoagulation     Eliquis   • Chronic diastolic congestive heart failure (CMS/HCC) 06/21/2023   • Hypertension 06/21/2023   • Other forms of angina pectoris 06/21/2023   • Palpitations 01/30/2024   • Personal history of other diseases of the circulatory system 12/08/2022    History of atrial fibrillation   • Personal history of other endocrine, nutritional and metabolic disease 11/17/2019    History of hyperglycemia   • Screening for colorectal cancer 03/21/2024   • Syncope 01/30/2024       Current Outpatient Medications:   •  amLODIPine (Norvasc) 10 mg tablet,  Take 1 tablet (10 mg) by mouth once daily., Disp: 90 tablet, Rfl: 3  •  carvedilol (Coreg) 25 mg tablet, take 1 tablet by mouth twice a day 90, Disp: , Rfl:   •  dofetilide (Tikosyn) 500 mcg capsule, take 1 capsule by mouth twice a day 90, Disp: , Rfl:   •  Entresto  mg tablet, Take 1 tablet by mouth 2 times a day., Disp: , Rfl:   •  tamsulosin (Flomax) 0.4 mg 24 hr capsule, TAKE 1 CAPSULE DAILY, ONE-HALF HOUR FOLLOWING THE SAME MEAL DAILY, Disp: 90 capsule, Rfl: 3  •  Eliquis 5 mg tablet, Take 1 tablet (5 mg) by mouth 2 times a day., Disp: 180 tablet, Rfl: 1    Current Facility-Administered Medications:   •  lactated Ringer's infusion, 100 mL/hr, intravenous, Continuous, Darryn Tee MD, Last Rate: 100 mL/hr at 03/21/24 1504, Continued by Anesthesia at 03/21/24 1504    Facility-Administered Medications Ordered in Other Encounters:   •  fentaNYL PF (Sublimaze) injection, , intravenous, PRN, KARLA Moody, 50 mcg at 03/21/24 1502  •  lidocaine (cardiac) (Xylocaine) injection, , intravenous, PRN, KARLA Moody, 80 mg at 03/21/24 1508  •  midazolam (Versed) injection, , intravenous, PRN, KRYSTLE MoodyCRNA, 1 mg at 03/21/24 1502  •  propofol (Diprivan) infusion, , intravenous, Continuous PRN, KARLA Moody, Last Rate: 76.32 mL/hr at 03/21/24 1510, 120 mcg/kg/min at 03/21/24 1510  Prior to Admission medications    Medication Sig Start Date End Date Taking? Authorizing Provider   amLODIPine (Norvasc) 10 mg tablet Take 1 tablet (10 mg) by mouth once daily. 1/30/24 1/24/25 Yes Cammie Willams MD   carvedilol (Coreg) 25 mg tablet take 1 tablet by mouth twice a day 90   Yes Historical Provider, MD   dofetilide (Tikosyn) 500 mcg capsule take 1 capsule by mouth twice a day 90   Yes Historical Provider, MD   Entresto  mg tablet Take 1 tablet by mouth 2 times a day. 12/7/21  Yes Historical Provider, MD   tamsulosin (Flomax) 0.4 mg 24 hr capsule TAKE 1 CAPSULE DAILY, ONE-HALF  HOUR FOLLOWING THE SAME MEAL DAILY 11/27/23  Yes IHSAN Sneed   Eliquis 5 mg tablet Take 1 tablet (5 mg) by mouth 2 times a day. 1/15/24 7/13/24  IHASN Mishra     No Known Allergies  Social History     Tobacco Use   • Smoking status: Never     Passive exposure: Never   • Smokeless tobacco: Never   Substance Use Topics   • Alcohol use: Yes     Comment: social         Chemistry    Lab Results   Component Value Date/Time     01/25/2024 0805    K 4.4 01/25/2024 0805     01/25/2024 0805    CO2 26 01/25/2024 0805    BUN 13 01/25/2024 0805    CREATININE 0.93 01/25/2024 0805    Lab Results   Component Value Date/Time    CALCIUM 8.6 01/25/2024 0805    ALKPHOS 47 01/25/2024 0805    AST 13 01/25/2024 0805    ALT 13 01/25/2024 0805    BILITOT 0.5 01/25/2024 0805          Lab Results   Component Value Date/Time    WBC 4.8 01/25/2024 0805    HGB 13.1 (L) 01/25/2024 0805    HCT 40.1 (L) 01/25/2024 0805     01/25/2024 0805     Lab Results   Component Value Date/Time    PROTIME 12.1 08/11/2021 1113    INR 1.1 08/11/2021 1113     Encounter Date: 01/15/24   ECG 12 lead (Clinic Performed)    Narrative    Normal sinus rhythm at 72 bpm.  AL interval 200 ms, QRS duration 96 ms,   QTc 453 ms     No results found for this or any previous visit from the past 1095 days.     Anesthesia Plan    History of general anesthesia?: yes  History of complications of general anesthesia?: no    ASA 3     MAC     The patient is not a current smoker.    intravenous induction   Anesthetic plan and risks discussed with patient.  Use of blood products discussed with patient who.

## 2024-03-21 NOTE — ANESTHESIA POSTPROCEDURE EVALUATION
Patient: Alcides Tovar    Procedure Summary       Date: 03/21/24 Room / Location: [facility] OR    Anesthesia Start: 1504 Anesthesia Stop: 1531    Procedure: COLONOSCOPY Diagnosis: Screening for colorectal cancer    Scheduled Providers: Nick Orozco MD Responsible Provider: KARLA Moody    Anesthesia Type: MAC ASA Status: 3            Anesthesia Type: MAC    Vitals Value Taken Time   BP 95/52 03/21/24 1530   Temp 36.2 °C (97.2 °F) 03/21/24 1530   Pulse 85 03/21/24 1530   Resp 18 03/21/24 1530   SpO2 99 03/21/24 1554           No notable events documented.

## 2024-03-21 NOTE — H&P
General Surgery History and Physical    Referring Provider:  CLAUDIA Sneed-Alia Vidal APRN-*     Chief Complaint:  Colonoscopy    History of Present Illness:  This is a 73 y.o. male who presents for a colonoscopy.    Past Medical History:  Past Medical History:   Diagnosis Date    Atrial fibrillation (CMS/[facility]) 06/21/2023    Hx of ablation    BPH (benign prostatic hyperplasia)     Cardiomyopathy (CMS/[facility]) 01/30/2024    Cammie Willams MD  Physician : Cardiology    Chronic anticoagulation     Eliquis    Chronic diastolic congestive heart failure (CMS/[facility]) 06/21/2023    Hypertension 06/21/2023    Other forms of angina pectoris 06/21/2023    Palpitations 01/30/2024    Personal history of other diseases of the circulatory system 12/08/2022    History of atrial fibrillation    Personal history of other endocrine, nutritional and metabolic disease 11/17/2019    History of hyperglycemia    Screening for colorectal cancer 03/21/2024    Syncope 01/30/2024        Past Surgical History:  Past Surgical History:   Procedure Laterality Date    COLONOSCOPY  05/29/2013    Complete Colonoscopy    OTHER SURGICAL HISTORY  05/29/2013    Stress Test ECG Performed        Medications:  Current Outpatient Medications   Medication Instructions    amLODIPine (NORVASC) 10 mg, oral, Daily    carvedilol (Coreg) 25 mg tablet take 1 tablet by mouth twice a day 90    dofetilide (Tikosyn) 500 mcg capsule take 1 capsule by mouth twice a day 90    Eliquis 5 mg, oral, 2 times daily    Entresto  mg tablet 1 tablet, oral, 2 times daily    tamsulosin (Flomax) 0.4 mg 24 hr capsule TAKE 1 CAPSULE DAILY, ONE-HALF HOUR FOLLOWING THE SAME MEAL DAILY        Allergies:  No Known Allergies     Family History:  Family History   Problem Relation Name Age of Onset    Lymphoma Mother      Sudden death Father  44        heart attack        Social History:  Social History     Socioeconomic History    Marital status:      Spouse name:  "None    Number of children: None    Years of education: None    Highest education level: None   Occupational History    None   Tobacco Use    Smoking status: Never     Passive exposure: Never    Smokeless tobacco: Never   Vaping Use    Vaping Use: Never used   Substance and Sexual Activity    Alcohol use: Yes     Comment: social    Drug use: Never    Sexual activity: Defer   Other Topics Concern    None   Social History Narrative    None     Social Determinants of Health     Financial Resource Strain: Not on file   Food Insecurity: Not on file   Transportation Needs: Not on file   Physical Activity: Not on file   Stress: Not on file   Social Connections: Not on file   Intimate Partner Violence: Not on file   Housing Stability: Not on file        Review of Systems:  A complete 12 point review of systems was performed and is negative except as noted in the history of present illness.      Vital Signs:  Vitals:    03/21/24 1314   BP: 153/81   Pulse: 74   Resp: 18   Temp: 36.2 °C (97.2 °F)   SpO2: 97%          Physical Exam:  General: No acute distress. Sitting up in bed.   Neuro: Alert and oriented ×3. Follows commands.  Head: Atraumatic  Eyes: Pupils equal reactive to light. Extraocular motions intact.  Ears: Hears normal speaking voice.  Mouth, Nose, Throat: Mucous membranes moist.  Normal dentition.  Neck: Supple. No appreciable masses.  Chest: No crepitus.    Heart: Regular rate and rhythm.  Lung: Clear to auscultation bilaterally.  Vascular: No carotid bruits.  Palpable radial pulses bilaterally.  Abdomen: Soft. Nondistended. Nontender.    Musculoskeletal: Moves all extremities.  Normal range of motion.  Lymphatic: No palpable lymph nodes.  Skin: No rashes or lesions.  Psychological: Normal affect      Laboratory Values:  CBC: No results found for: \"WBC\", \"RBC\", \"HGB\", \"HCT\", \"PLT\"    RFP: No results found for: \"GLUF\", \"NA\", \"K\", \"CL\", \"CO2\", \"BUN\", \"CREATININE\", \"CALCIUM\", \"MG\", \"PHOS\"     LFTs: No results found " "for: \"PROT\", \"ALBUMIN\", \"BILITOT\", \"BILIDIR\", \"ALKPHOS\", \"AST\", \"ALT\"         Assessment:  This is a 73 y.o. male who presents for a colonoscopy.      Plan:  -- Colonoscopy.      Marino Orozco MD  General Surgery  Office: [phone]  Fax:     [phone]  2:52 PM   03/21/24     "

## 2024-03-27 LAB
LABORATORY COMMENT REPORT: NORMAL
PATH REPORT.FINAL DX SPEC: NORMAL
PATH REPORT.GROSS SPEC: NORMAL
PATH REPORT.TOTAL CANCER: NORMAL

## 2024-04-06 ENCOUNTER — APPOINTMENT (OUTPATIENT)
Dept: CARDIOLOGY | Facility: HOSPITAL | Age: 74
DRG: 309 | End: 2024-04-06
Payer: MEDICARE

## 2024-04-06 ENCOUNTER — APPOINTMENT (OUTPATIENT)
Dept: RADIOLOGY | Facility: HOSPITAL | Age: 74
DRG: 309 | End: 2024-04-06
Payer: MEDICARE

## 2024-04-06 ENCOUNTER — HOSPITAL ENCOUNTER (INPATIENT)
Facility: HOSPITAL | Age: 74
LOS: 2 days | Discharge: HOME | DRG: 309 | End: 2024-04-09
Attending: EMERGENCY MEDICINE | Admitting: INTERNAL MEDICINE
Payer: MEDICARE

## 2024-04-06 DIAGNOSIS — I48.0 PAROXYSMAL ATRIAL FIBRILLATION (MULTI): ICD-10-CM

## 2024-04-06 DIAGNOSIS — R00.2 PALPITATIONS: ICD-10-CM

## 2024-04-06 DIAGNOSIS — I48.91 ATRIAL FIBRILLATION WITH RAPID VENTRICULAR RESPONSE (MULTI): Primary | ICD-10-CM

## 2024-04-06 DIAGNOSIS — R73.9 HYPERGLYCEMIA: ICD-10-CM

## 2024-04-06 DIAGNOSIS — I48.11 LONGSTANDING PERSISTENT ATRIAL FIBRILLATION (MULTI): ICD-10-CM

## 2024-04-06 DIAGNOSIS — I42.0 DILATED CARDIOMYOPATHY (MULTI): ICD-10-CM

## 2024-04-06 DIAGNOSIS — I10 PRIMARY HYPERTENSION: ICD-10-CM

## 2024-04-06 LAB
ALBUMIN SERPL-MCNC: 4.3 G/DL (ref 3.5–5)
ALP BLD-CCNC: 61 U/L (ref 35–125)
ALT SERPL-CCNC: 14 U/L (ref 5–40)
ANION GAP SERPL CALC-SCNC: 10 MMOL/L
AST SERPL-CCNC: 13 U/L (ref 5–40)
BASOPHILS # BLD AUTO: 0.06 X10*3/UL (ref 0–0.1)
BASOPHILS NFR BLD AUTO: 0.9 %
BILIRUB SERPL-MCNC: 0.3 MG/DL (ref 0.1–1.2)
BUN SERPL-MCNC: 14 MG/DL (ref 8–25)
CALCIUM SERPL-MCNC: 9.4 MG/DL (ref 8.5–10.4)
CHLORIDE SERPL-SCNC: 101 MMOL/L (ref 97–107)
CO2 SERPL-SCNC: 25 MMOL/L (ref 24–31)
CREAT SERPL-MCNC: 0.9 MG/DL (ref 0.4–1.6)
EGFRCR SERPLBLD CKD-EPI 2021: >90 ML/MIN/1.73M*2
EOSINOPHIL # BLD AUTO: 0.34 X10*3/UL (ref 0–0.4)
EOSINOPHIL NFR BLD AUTO: 4.9 %
ERYTHROCYTE [DISTWIDTH] IN BLOOD BY AUTOMATED COUNT: 13 % (ref 11.5–14.5)
GLUCOSE SERPL-MCNC: 123 MG/DL (ref 65–99)
HCT VFR BLD AUTO: 42 % (ref 41–52)
HGB BLD-MCNC: 14.1 G/DL (ref 13.5–17.5)
IMM GRANULOCYTES # BLD AUTO: 0.01 X10*3/UL (ref 0–0.5)
IMM GRANULOCYTES NFR BLD AUTO: 0.1 % (ref 0–0.9)
LYMPHOCYTES # BLD AUTO: 1.58 X10*3/UL (ref 0.8–3)
LYMPHOCYTES NFR BLD AUTO: 22.7 %
MAGNESIUM SERPL-MCNC: 2 MG/DL (ref 1.6–3.1)
MAGNESIUM SERPL-MCNC: 2.1 MG/DL (ref 1.6–3.1)
MCH RBC QN AUTO: 29.3 PG (ref 26–34)
MCHC RBC AUTO-ENTMCNC: 33.6 G/DL (ref 32–36)
MCV RBC AUTO: 87 FL (ref 80–100)
MONOCYTES # BLD AUTO: 0.68 X10*3/UL (ref 0.05–0.8)
MONOCYTES NFR BLD AUTO: 9.8 %
NEUTROPHILS # BLD AUTO: 4.29 X10*3/UL (ref 1.6–5.5)
NEUTROPHILS NFR BLD AUTO: 61.6 %
NRBC BLD-RTO: 0 /100 WBCS (ref 0–0)
NT-PROBNP SERPL-MCNC: 711 PG/ML (ref 0–229)
PLATELET # BLD AUTO: 233 X10*3/UL (ref 150–450)
POTASSIUM SERPL-SCNC: 3.9 MMOL/L (ref 3.4–5.1)
PROT SERPL-MCNC: 7.1 G/DL (ref 5.9–7.9)
RBC # BLD AUTO: 4.82 X10*6/UL (ref 4.5–5.9)
SODIUM SERPL-SCNC: 136 MMOL/L (ref 133–145)
TROPONIN T SERPL-MCNC: 14 NG/L
TROPONIN T SERPL-MCNC: 8 NG/L
TROPONIN T SERPL-MCNC: 9 NG/L
WBC # BLD AUTO: 7 X10*3/UL (ref 4.4–11.3)

## 2024-04-06 PROCEDURE — 80053 COMPREHEN METABOLIC PANEL: CPT | Performed by: EMERGENCY MEDICINE

## 2024-04-06 PROCEDURE — 83735 ASSAY OF MAGNESIUM: CPT | Performed by: INTERNAL MEDICINE

## 2024-04-06 PROCEDURE — 2500000002 HC RX 250 W HCPCS SELF ADMINISTERED DRUGS (ALT 637 FOR MEDICARE OP, ALT 636 FOR OP/ED): Performed by: INTERNAL MEDICINE

## 2024-04-06 PROCEDURE — 84484 ASSAY OF TROPONIN QUANT: CPT | Performed by: EMERGENCY MEDICINE

## 2024-04-06 PROCEDURE — 96374 THER/PROPH/DIAG INJ IV PUSH: CPT

## 2024-04-06 PROCEDURE — 2500000004 HC RX 250 GENERAL PHARMACY W/ HCPCS (ALT 636 FOR OP/ED): Performed by: EMERGENCY MEDICINE

## 2024-04-06 PROCEDURE — 83735 ASSAY OF MAGNESIUM: CPT | Performed by: EMERGENCY MEDICINE

## 2024-04-06 PROCEDURE — 96361 HYDRATE IV INFUSION ADD-ON: CPT

## 2024-04-06 PROCEDURE — G0378 HOSPITAL OBSERVATION PER HR: HCPCS

## 2024-04-06 PROCEDURE — 99222 1ST HOSP IP/OBS MODERATE 55: CPT | Performed by: INTERNAL MEDICINE

## 2024-04-06 PROCEDURE — 99285 EMERGENCY DEPT VISIT HI MDM: CPT | Mod: 25

## 2024-04-06 PROCEDURE — 83880 ASSAY OF NATRIURETIC PEPTIDE: CPT | Performed by: EMERGENCY MEDICINE

## 2024-04-06 PROCEDURE — 2500000001 HC RX 250 WO HCPCS SELF ADMINISTERED DRUGS (ALT 637 FOR MEDICARE OP): Performed by: INTERNAL MEDICINE

## 2024-04-06 PROCEDURE — 2500000001 HC RX 250 WO HCPCS SELF ADMINISTERED DRUGS (ALT 637 FOR MEDICARE OP): Performed by: EMERGENCY MEDICINE

## 2024-04-06 PROCEDURE — 85025 COMPLETE CBC W/AUTO DIFF WBC: CPT | Performed by: EMERGENCY MEDICINE

## 2024-04-06 PROCEDURE — 36415 COLL VENOUS BLD VENIPUNCTURE: CPT | Performed by: EMERGENCY MEDICINE

## 2024-04-06 PROCEDURE — 71045 X-RAY EXAM CHEST 1 VIEW: CPT

## 2024-04-06 PROCEDURE — 71045 X-RAY EXAM CHEST 1 VIEW: CPT | Performed by: RADIOLOGY

## 2024-04-06 PROCEDURE — 2500000005 HC RX 250 GENERAL PHARMACY W/O HCPCS: Performed by: EMERGENCY MEDICINE

## 2024-04-06 PROCEDURE — 93005 ELECTROCARDIOGRAM TRACING: CPT

## 2024-04-06 RX ORDER — TALC
3 POWDER (GRAM) TOPICAL NIGHTLY PRN
Status: DISCONTINUED | OUTPATIENT
Start: 2024-04-06 | End: 2024-04-09 | Stop reason: HOSPADM

## 2024-04-06 RX ORDER — METOPROLOL TARTRATE 1 MG/ML
5 INJECTION, SOLUTION INTRAVENOUS ONCE
Status: DISCONTINUED | OUTPATIENT
Start: 2024-04-06 | End: 2024-04-06

## 2024-04-06 RX ORDER — ACETAMINOPHEN 160 MG/5ML
650 SOLUTION ORAL EVERY 4 HOURS PRN
Status: DISCONTINUED | OUTPATIENT
Start: 2024-04-06 | End: 2024-04-09 | Stop reason: HOSPADM

## 2024-04-06 RX ORDER — ACETAMINOPHEN 325 MG/1
650 TABLET ORAL EVERY 4 HOURS PRN
Status: DISCONTINUED | OUTPATIENT
Start: 2024-04-06 | End: 2024-04-09 | Stop reason: HOSPADM

## 2024-04-06 RX ORDER — PANTOPRAZOLE SODIUM 40 MG/10ML
40 INJECTION, POWDER, LYOPHILIZED, FOR SOLUTION INTRAVENOUS
Status: DISCONTINUED | OUTPATIENT
Start: 2024-04-06 | End: 2024-04-09 | Stop reason: HOSPADM

## 2024-04-06 RX ORDER — METOPROLOL TARTRATE 1 MG/ML
5 INJECTION, SOLUTION INTRAVENOUS ONCE
Status: COMPLETED | OUTPATIENT
Start: 2024-04-06 | End: 2024-04-06

## 2024-04-06 RX ORDER — DOFETILIDE 0.5 MG/1
500 CAPSULE ORAL EVERY 12 HOURS
Status: DISCONTINUED | OUTPATIENT
Start: 2024-04-06 | End: 2024-04-09 | Stop reason: HOSPADM

## 2024-04-06 RX ORDER — POLYETHYLENE GLYCOL 3350 17 G/17G
17 POWDER, FOR SOLUTION ORAL DAILY
Status: DISCONTINUED | OUTPATIENT
Start: 2024-04-06 | End: 2024-04-09 | Stop reason: HOSPADM

## 2024-04-06 RX ORDER — AMLODIPINE BESYLATE 10 MG/1
10 TABLET ORAL DAILY
Status: DISCONTINUED | OUTPATIENT
Start: 2024-04-06 | End: 2024-04-07

## 2024-04-06 RX ORDER — PANTOPRAZOLE SODIUM 40 MG/1
40 TABLET, DELAYED RELEASE ORAL
Status: DISCONTINUED | OUTPATIENT
Start: 2024-04-06 | End: 2024-04-09 | Stop reason: HOSPADM

## 2024-04-06 RX ORDER — ACETAMINOPHEN 650 MG/1
650 SUPPOSITORY RECTAL EVERY 4 HOURS PRN
Status: DISCONTINUED | OUTPATIENT
Start: 2024-04-06 | End: 2024-04-09 | Stop reason: HOSPADM

## 2024-04-06 RX ORDER — TAMSULOSIN HYDROCHLORIDE 0.4 MG/1
0.4 CAPSULE ORAL DAILY
Status: DISCONTINUED | OUTPATIENT
Start: 2024-04-06 | End: 2024-04-09 | Stop reason: HOSPADM

## 2024-04-06 RX ORDER — CARVEDILOL 25 MG/1
50 TABLET ORAL
Status: DISCONTINUED | OUTPATIENT
Start: 2024-04-06 | End: 2024-04-08

## 2024-04-06 RX ORDER — CARVEDILOL 25 MG/1
25 TABLET ORAL ONCE
Status: COMPLETED | OUTPATIENT
Start: 2024-04-06 | End: 2024-04-06

## 2024-04-06 RX ADMIN — DOFETILIDE 500 MCG: 0.5 CAPSULE ORAL at 21:31

## 2024-04-06 RX ADMIN — APIXABAN 5 MG: 5 TABLET, FILM COATED ORAL at 17:55

## 2024-04-06 RX ADMIN — SODIUM CHLORIDE 1000 ML: 900 INJECTION, SOLUTION INTRAVENOUS at 02:24

## 2024-04-06 RX ADMIN — DOFETILIDE 500 MCG: 0.5 CAPSULE ORAL at 10:39

## 2024-04-06 RX ADMIN — CARVEDILOL 25 MG: 25 TABLET, FILM COATED ORAL at 03:55

## 2024-04-06 RX ADMIN — AMLODIPINE BESYLATE 10 MG: 10 TABLET ORAL at 10:17

## 2024-04-06 RX ADMIN — SACUBITRIL AND VALSARTAN 1 TABLET: 97; 103 TABLET, FILM COATED ORAL at 22:11

## 2024-04-06 RX ADMIN — METOPROLOL TARTRATE 5 MG: 5 INJECTION INTRAVENOUS at 02:24

## 2024-04-06 RX ADMIN — APIXABAN 5 MG: 5 TABLET, FILM COATED ORAL at 10:19

## 2024-04-06 RX ADMIN — TAMSULOSIN HYDROCHLORIDE 0.4 MG: 0.4 CAPSULE ORAL at 10:17

## 2024-04-06 RX ADMIN — SACUBITRIL AND VALSARTAN 1 TABLET: 97; 103 TABLET, FILM COATED ORAL at 10:39

## 2024-04-06 RX ADMIN — CARVEDILOL 50 MG: 25 TABLET, FILM COATED ORAL at 08:06

## 2024-04-06 RX ADMIN — PANTOPRAZOLE SODIUM 40 MG: 40 TABLET, DELAYED RELEASE ORAL at 06:36

## 2024-04-06 RX ADMIN — CARVEDILOL 25 MG: 25 TABLET, FILM COATED ORAL at 17:00

## 2024-04-06 SDOH — ECONOMIC STABILITY: INCOME INSECURITY: IN THE LAST 12 MONTHS, WAS THERE A TIME WHEN YOU WERE NOT ABLE TO PAY THE MORTGAGE OR RENT ON TIME?: NO

## 2024-04-06 SDOH — HEALTH STABILITY: PHYSICAL HEALTH: ON AVERAGE, HOW MANY DAYS PER WEEK DO YOU ENGAGE IN MODERATE TO STRENUOUS EXERCISE (LIKE A BRISK WALK)?: 2 DAYS

## 2024-04-06 SDOH — SOCIAL STABILITY: SOCIAL INSECURITY: HAS ANYONE EVER THREATENED TO HURT YOUR FAMILY OR YOUR PETS?: NO

## 2024-04-06 SDOH — SOCIAL STABILITY: SOCIAL INSECURITY: ARE THERE ANY APPARENT SIGNS OF INJURIES/BEHAVIORS THAT COULD BE RELATED TO ABUSE/NEGLECT?: NO

## 2024-04-06 SDOH — SOCIAL STABILITY: SOCIAL INSECURITY: DOES ANYONE TRY TO KEEP YOU FROM HAVING/CONTACTING OTHER FRIENDS OR DOING THINGS OUTSIDE YOUR HOME?: NO

## 2024-04-06 SDOH — SOCIAL STABILITY: SOCIAL INSECURITY: HAVE YOU HAD THOUGHTS OF HARMING ANYONE ELSE?: NO

## 2024-04-06 SDOH — SOCIAL STABILITY: SOCIAL INSECURITY: DO YOU FEEL UNSAFE GOING BACK TO THE PLACE WHERE YOU ARE LIVING?: NO

## 2024-04-06 SDOH — ECONOMIC STABILITY: INCOME INSECURITY: HOW HARD IS IT FOR YOU TO PAY FOR THE VERY BASICS LIKE FOOD, HOUSING, MEDICAL CARE, AND HEATING?: SOMEWHAT HARD

## 2024-04-06 SDOH — SOCIAL STABILITY: SOCIAL INSECURITY: ARE YOU OR HAVE YOU BEEN THREATENED OR ABUSED PHYSICALLY, EMOTIONALLY, OR SEXUALLY BY ANYONE?: NO

## 2024-04-06 SDOH — SOCIAL STABILITY: SOCIAL INSECURITY: ABUSE: ADULT

## 2024-04-06 SDOH — HEALTH STABILITY: PHYSICAL HEALTH: ON AVERAGE, HOW MANY MINUTES DO YOU ENGAGE IN EXERCISE AT THIS LEVEL?: 40 MIN

## 2024-04-06 SDOH — SOCIAL STABILITY: SOCIAL INSECURITY: WERE YOU ABLE TO COMPLETE ALL THE BEHAVIORAL HEALTH SCREENINGS?: YES

## 2024-04-06 SDOH — SOCIAL STABILITY: SOCIAL INSECURITY: DO YOU FEEL ANYONE HAS EXPLOITED OR TAKEN ADVANTAGE OF YOU FINANCIALLY OR OF YOUR PERSONAL PROPERTY?: NO

## 2024-04-06 ASSESSMENT — LIFESTYLE VARIABLES
HOW OFTEN DURING THE LAST YEAR HAVE YOU NEEDED AN ALCOHOLIC DRINK FIRST THING IN THE MORNING TO GET YOURSELF GOING AFTER A NIGHT OF HEAVY DRINKING: NEVER
AUDIT-C TOTAL SCORE: 4
HOW OFTEN DURING THE LAST YEAR HAVE YOU HAD A FEELING OF GUILT OR REMORSE AFTER DRINKING: NEVER
HOW OFTEN DO YOU HAVE 6 OR MORE DRINKS ON ONE OCCASION: MONTHLY
SUBSTANCE_ABUSE_PAST_12_MONTHS: NO
AUDIT-C TOTAL SCORE: 4
HAVE YOU OR SOMEONE ELSE BEEN INJURED AS A RESULT OF YOUR DRINKING: NO
AUDIT TOTAL SCORE: 4
PRESCIPTION_ABUSE_PAST_12_MONTHS: NO
HOW OFTEN DO YOU HAVE A DRINK CONTAINING ALCOHOL: 2-4 TIMES A MONTH
HOW OFTEN DURING THE LAST YEAR HAVE YOU FAILED TO DO WHAT WAS NORMALLY EXPECTED FROM YOU BECAUSE OF DRINKING: NEVER
AUDIT TOTAL SCORE: 0
HOW MANY STANDARD DRINKS CONTAINING ALCOHOL DO YOU HAVE ON A TYPICAL DAY: 1 OR 2
HAS A RELATIVE, FRIEND, DOCTOR, OR ANOTHER HEALTH PROFESSIONAL EXPRESSED CONCERN ABOUT YOUR DRINKING OR SUGGESTED YOU CUT DOWN: NO
SKIP TO QUESTIONS 9-10: 0
HOW OFTEN DURING THE LAST YEAR HAVE YOU BEEN UNABLE TO REMEMBER WHAT HAPPENED THE NIGHT BEFORE BECAUSE YOU HAD BEEN DRINKING: NEVER
HOW OFTEN DURING THE LAST YEAR HAVE YOU FOUND THAT YOU WERE NOT ABLE TO STOP DRINKING ONCE YOU HAD STARTED: NEVER

## 2024-04-06 ASSESSMENT — COGNITIVE AND FUNCTIONAL STATUS - GENERAL
DAILY ACTIVITIY SCORE: 24
MOBILITY SCORE: 24
PATIENT BASELINE BEDBOUND: NO
DAILY ACTIVITIY SCORE: 24
MOBILITY SCORE: 24

## 2024-04-06 ASSESSMENT — PATIENT HEALTH QUESTIONNAIRE - PHQ9
2. FEELING DOWN, DEPRESSED OR HOPELESS: NOT AT ALL
SUM OF ALL RESPONSES TO PHQ9 QUESTIONS 1 & 2: 0
1. LITTLE INTEREST OR PLEASURE IN DOING THINGS: NOT AT ALL

## 2024-04-06 ASSESSMENT — ENCOUNTER SYMPTOMS
NEUROLOGICAL NEGATIVE: 1
RESPIRATORY NEGATIVE: 1
HEMATOLOGIC/LYMPHATIC NEGATIVE: 1
PALPITATIONS: 1
ALLERGIC/IMMUNOLOGIC NEGATIVE: 1
EYES NEGATIVE: 1
PSYCHIATRIC NEGATIVE: 1
MUSCULOSKELETAL NEGATIVE: 1
CONSTITUTIONAL NEGATIVE: 1
ENDOCRINE NEGATIVE: 1
GASTROINTESTINAL NEGATIVE: 1

## 2024-04-06 ASSESSMENT — COLUMBIA-SUICIDE SEVERITY RATING SCALE - C-SSRS
2. HAVE YOU ACTUALLY HAD ANY THOUGHTS OF KILLING YOURSELF?: NO
1. IN THE PAST MONTH, HAVE YOU WISHED YOU WERE DEAD OR WISHED YOU COULD GO TO SLEEP AND NOT WAKE UP?: NO
2. HAVE YOU ACTUALLY HAD ANY THOUGHTS OF KILLING YOURSELF?: NO
6. HAVE YOU EVER DONE ANYTHING, STARTED TO DO ANYTHING, OR PREPARED TO DO ANYTHING TO END YOUR LIFE?: NO
1. IN THE PAST MONTH, HAVE YOU WISHED YOU WERE DEAD OR WISHED YOU COULD GO TO SLEEP AND NOT WAKE UP?: NO
6. HAVE YOU EVER DONE ANYTHING, STARTED TO DO ANYTHING, OR PREPARED TO DO ANYTHING TO END YOUR LIFE?: NO

## 2024-04-06 ASSESSMENT — ACTIVITIES OF DAILY LIVING (ADL)
TOILETING: INDEPENDENT
GROOMING: INDEPENDENT
ADEQUATE_TO_COMPLETE_ADL: NO
HEARING - RIGHT EAR: DIFFICULTY WITH NOISE
FEEDING YOURSELF: INDEPENDENT
PATIENT'S MEMORY ADEQUATE TO SAFELY COMPLETE DAILY ACTIVITIES?: NO
BATHING: INDEPENDENT
WALKS IN HOME: INDEPENDENT
DRESSING YOURSELF: INDEPENDENT
HEARING - LEFT EAR: DIFFICULTY WITH NOISE
JUDGMENT_ADEQUATE_SAFELY_COMPLETE_DAILY_ACTIVITIES: NO
LACK_OF_TRANSPORTATION: NO

## 2024-04-06 ASSESSMENT — PAIN - FUNCTIONAL ASSESSMENT
PAIN_FUNCTIONAL_ASSESSMENT: 0-10

## 2024-04-06 ASSESSMENT — PAIN SCALES - GENERAL
PAINLEVEL_OUTOF10: 0 - NO PAIN

## 2024-04-06 NOTE — CARE PLAN
The patient's goals for the shift include  no palpitations    The clinical goals for the shift include heart rate control

## 2024-04-06 NOTE — CONSULTS
"Inpatient consult to Cardiology  Consult performed by: Cammie Willams MD  Consult ordered by: Rajni Lau MD  Reason for consult: Atrial fibrillation        History Of Present Illness:    Alcides Tovar is a 73 y.o. male presenting with palpitations and atrial fibrillation.  Patient is well-known to my practice.  Has a history of paroxysmal atrial fibrillation status post ablation few years ago and on dofetilide.  Patient have not had atrial fibrillation in more than a year.  Presented to the hospital yesterday with symptoms of palpitations not feeling good.  Patient was found to be in atrial fibrillation.  Was admitted under observation.  Telemetry overnight reviewed he remains in atrial fibrillation rate controlled after doubling up on his carvedilol.  Blood pressure well-controlled.  Electrolyte and TSH within normal limit.    Review of systems.  10 point review of systems otherwise negative     Last Recorded Vitals:  Vitals:    04/06/24 0147 04/06/24 0612 04/06/24 0743   BP: 120/87 132/84 118/73   BP Location: Right arm Left arm Left arm   Patient Position: Sitting Lying Lying   Pulse: (!) 112 109 87   Resp: 16 22 17   Temp: 36.7 °C (98.1 °F) 36.3 °C (97.3 °F) 36.3 °C (97.3 °F)   TempSrc: Temporal Temporal Temporal   SpO2: 98% 96% 97%   Weight: 109 kg (239 lb 3.2 oz) 104 kg (230 lb)    Height: 1.905 m (6' 3\") 1.905 m (6' 3\")        Last Labs:  CBC - 4/6/2024:  2:19 AM  7.0 14.1 233    42.0      CMP - 4/6/2024:  2:19 AM  9.4 7.1 13 --- 0.3   _ 4.3 14 61      PTT - No results in last year.  _   _ _     LDL Calculated   Date/Time Value Ref Range Status   01/25/2024 08:05 AM 79 <=99 mg/dL Final     Comment:                                 Near   Borderline      AGE      Desirable  Optimal    High     High     Very High     0-19 Y     0 - 109     ---    110-129   >/= 130     ----    20-24 Y     0 - 119     ---    120-159   >/= 160     ----      >24 Y     0 -  99   100-129  130-159   160-189     >/=190   " "    VLDL   Date/Time Value Ref Range Status   01/25/2024 08:05 AM 9 0 - 40 mg/dL Final   01/13/2023 08:15 AM 11 0 - 40 mg/dL Final   12/17/2021 08:22 AM 12 0 - 40 mg/dL Final   11/03/2020 07:43 AM 16 0 - 40 mg/dL Final      Last I/O:  No intake/output data recorded.    Past Cardiology Tests (Last 3 Years):  EKG:  ECG 12 lead (Clinic Performed) 01/15/2024 (Preliminary)    Echo:  No results found for this or any previous visit from the past 1095 days.    Ejection Fractions:  No results found for: \"EF\"  Cath:  No results found for this or any previous visit from the past 1095 days.    Stress Test:  No results found for this or any previous visit from the past 1095 days.    Cardiac Imaging:  No results found for this or any previous visit from the past 1095 days.      Past Medical History:  He has a past medical history of Atrial fibrillation (CMS/HCC) (06/21/2023), BPH (benign prostatic hyperplasia), Cardiomyopathy (CMS/HCC) (01/30/2024), Chronic anticoagulation, Chronic diastolic congestive heart failure (CMS/HCC) (06/21/2023), Hypertension (06/21/2023), Other forms of angina pectoris (CMS/HCC) (06/21/2023), Palpitations (01/30/2024), Personal history of other diseases of the circulatory system (12/08/2022), Personal history of other endocrine, nutritional and metabolic disease (11/17/2019), Screening for colorectal cancer (03/21/2024), and Syncope (01/30/2024).    Past Surgical History:  He has a past surgical history that includes Other surgical history (05/29/2013) and Colonoscopy (05/29/2013).      Social History:  He reports that he has never smoked. He has never been exposed to tobacco smoke. He has never used smokeless tobacco. He reports current alcohol use. He reports that he does not use drugs.    Family History:  Family History   Problem Relation Name Age of Onset    Lymphoma Mother      Sudden death Father  44        heart attack        Allergies:  Patient has no known allergies.    Inpatient " Medications:  Scheduled medications   Medication Dose Route Frequency    amLODIPine  10 mg oral Daily    apixaban  5 mg oral BID    carvedilol  50 mg oral BID with meals    dofetilide  500 mcg oral q12h    pantoprazole  40 mg oral Daily before breakfast    Or    pantoprazole  40 mg intravenous Daily before breakfast    polyethylene glycol  17 g oral Daily    sacubitriL-valsartan  1 tablet oral BID    tamsulosin  0.4 mg oral Daily     PRN medications   Medication    acetaminophen    Or    acetaminophen    Or    acetaminophen    melatonin     Continuous Medications   Medication Dose Last Rate     Outpatient Medications:  Current Outpatient Medications   Medication Instructions    amLODIPine (NORVASC) 10 mg, oral, Daily    carvedilol (Coreg) 25 mg tablet take 1 tablet by mouth twice a day 90    dofetilide (Tikosyn) 500 mcg capsule take 1 capsule by mouth twice a day 90    Eliquis 5 mg, oral, 2 times daily    Entresto  mg tablet 1 tablet, oral, 2 times daily    tamsulosin (Flomax) 0.4 mg 24 hr capsule TAKE 1 CAPSULE DAILY, ONE-HALF HOUR FOLLOWING THE SAME MEAL DAILY       Physical Exam:  General: Patient is in no acute distress.  HEENT: atraumatic normocephalic.  Neck: is supple jugular venous pressure within normal limits no thyromegaly.  Cardiovascular irregular rate and rhythm normal heart sounds no murmurs rubs or gallops.  Lungs: clear to auscultation bilaterally.  Abdomen: is soft nontender.  Extremities warm to touch no edema.  Neurologic examination: patient is awake alert oriented to person, place, date/time.  Psychiatric examination: patient has good insight denies feeling suicidal and depressed.  Pulses 2+ intact bilaterally     Assessment/Plan   #1 atrial fibrillation.  Patient with history of paroxysmal atrial fibrillation status post ablation and on dofetilide follows up with me and Dr. Peterson.  Recommend synchronized cardioversion.  Continue oral anticoagulation and dofetilide.  This could be  done as an outpatient if patient prefers to.  Discharged home otherwise we will do it on Monday.    2.  Hypertension.  Blood pressure and heart rate well-controlled.  Increase carvedilol 25 mg oral twice daily for better rate control of A-fib continue other home medications.    3.  Hyperlipidemia continue high intensity statin.    Thank you for allowing to participate in his care  Peripheral IV 04/06/24 20 G Right Antecubital (Active)   Site Assessment Clean;Intact;Dry 04/06/24 0620   Dressing Type Transparent 04/06/24 0620   Dressing Status Clean;Dry;Occlusive 04/06/24 0620   Number of days: 0       Code Status:  Full Code    Cammie Willams MD

## 2024-04-06 NOTE — ED PROVIDER NOTES
HPI   Chief Complaint   Patient presents with    Rapid Heart Rate     Pt states he was sitting at home and started to feel like his heart rate was up 4 hours ago. Pt took his pulse and BP at home stating his heart rate was over 100 and his BP was 140/100. Pt denies any pain.       HPI       73-year-old male reports chest fluttering and high blood pressure.  He has history of paroxysmal A-fib status post ablation.  Had 1 short episode of A-fib after the ablation but has nothing since then.  States he was sitting watching the news and felt fluttering and uncomfortable sensation of chest.  No chest pain or shortness of breath.  Persisted through the night.  He checked his pulse and blood pressure at home and both were elevated so he came here for evaluation.  Currently is taking anticoagulation and Tikosyn             No data recorded                   Patient History   Past Medical History:   Diagnosis Date    Atrial fibrillation (CMS/[facility]) 06/21/2023    Hx of ablation    BPH (benign prostatic hyperplasia)     Cardiomyopathy (CMS/[facility]) 01/30/2024    Cammie Willams MD  Physician : Cardiology    Chronic anticoagulation     Eliquis    Chronic diastolic congestive heart failure (CMS/[facility]) 06/21/2023    Hypertension 06/21/2023    Other forms of angina pectoris (CMS/[facility]) 06/21/2023    Palpitations 01/30/2024    Personal history of other diseases of the circulatory system 12/08/2022    History of atrial fibrillation    Personal history of other endocrine, nutritional and metabolic disease 11/17/2019    History of hyperglycemia    Screening for colorectal cancer 03/21/2024    Syncope 01/30/2024     Past Surgical History:   Procedure Laterality Date    COLONOSCOPY  05/29/2013    Complete Colonoscopy    OTHER SURGICAL HISTORY  05/29/2013    Stress Test ECG Performed     Family History   Problem Relation Name Age of Onset    Lymphoma Mother      Sudden death Father  44        heart attack     Social History     Tobacco Use     Smoking status: Never     Passive exposure: Never    Smokeless tobacco: Never   Vaping Use    Vaping Use: Never used   Substance Use Topics    Alcohol use: Yes     Comment: social    Drug use: Never       Physical Exam   ED Triage Vitals [04/06/24 0147]   Temperature Heart Rate Respirations BP   36.7 °C (98.1 °F) (!) 112 16 120/87      Pulse Ox Temp Source Heart Rate Source Patient Position   98 % Temporal Monitor Sitting      BP Location FiO2 (%)     Right arm --       Physical Exam    Gen:  Well nourished, no acute distress  Head: Normocephalic, atraumatic  Eyes: PERRL, EOMI, conjunctiva clear  ENT: External ears and nose normal, OP clear, Mucosa moist  Neck: Supple, no tenderness  Chest: No tenderness, no crepitus  CV: Tachycardic, irregularly irregular, no Murmur  Lungs: Clear bilaterally, no distress  Abd: No tenderness, no rebound or guarding  Extremities: FROM, no edema  Neuro: Cranial nerves intact, moving all 4 extremities, A&O x 4  Psych: Appropriate behavior and affect  Back: No focal tenderness, no CVA tenderness  Skin: No rashes or lesions noted    ED Course & MDM   ED Course as of 04/06/24 0519   Sat Apr 06, 2024   0224 EKG was ordered by me and interpreted by me.  Atrial fibrillation with rapid response at 119.  Normal QT interval.  Left axis deviation.  Poor anterior transition.  Flat T waves anterior lateral.  No other acute changes [AK]   0517 The following diagnostic tests were ordered by me and interpreted by me.  Chemistry panel within normal notes.  CBC within normal notes.  proBNP 711.  Magnesium 2.1.  Initial troponin 14.  Repeat 8 [AK]      ED Course User Index  [AK] Devang Ndiaye MD         Diagnoses as of 04/06/24 0519   Atrial fibrillation with rapid ventricular response (CMS/HCC)   Patient has history of atrial fibrillation status post ablation in 2021.  He had a short episode of A-fib after that but has been in normal sinus ever since.  Reports onset of atrial fibrillation about 2 hours  prior to arrival here while at rest.  Feels an uncomfortable sensation in his chest and fluttering but no real pain or pressure.  No shortness of breath.  Presented tachycardic at 112 with elevated blood pressure.  Initially given 1 5 mg dose of metoprolol.  That brought his pulse down to as low as 60.  He has remained in atrial fibrillation.  Initial troponin negative.  No signs of acute coronary syndrome on EKG.  I gave him his dose of Coreg here.  Repeat troponin still negative.  He is remained in atrial fibrillation.  I discussed case with his cardiologist Dr. Willams.  He advised that patient could be admitted for observation here or if he wanted to go home he could discharge him home on double dose of Coreg.  I shared information with patient.  He would prefer to stay.  He states he still feels uncomfortable sensation in his chest and was concerned about his heart rate increasing when he went home.  When he talks it goes up to the low 100s but typically has been in the 80s after the Coreg.  No signs of acute cord syndrome, pulmonary emboli or non-STEMI.  Discussed case with hospitalist who agreed to accept him on his service    Medical Decision Making      Procedure  Procedures     Devang Ndiaye MD  04/06/24 [id number]

## 2024-04-06 NOTE — PROGRESS NOTES
Alcides Tovar is a 73 y.o. male on day 0 of admission presenting with A-fib (CMS/HCC).      Subjective   Patient seen and examined. Resting in bed. States he feels tired, did not sleep last night. Denies any SOB or chest pain. Afebrile.        Objective     Last Recorded Vitals  BP 99/56 (BP Location: Left arm, Patient Position: Lying)   Pulse 109   Temp 36 °C (96.8 °F) (Temporal)   Resp 16   Wt 104 kg (230 lb)   SpO2 94%   Intake/Output last 3 Shifts:  No intake or output data in the 24 hours ending 04/06/24 1214    Admission Weight  Weight: 109 kg (239 lb 3.2 oz) (04/06/24 0147)    Daily Weight  04/06/24 : 104 kg (230 lb)    Image Results  XR chest 1 view  Narrative: Interpreted By:  Finkelstein, Evan,   STUDY:  XR CHEST 1 VIEW;  4/6/2024 5:45 am      INDICATION:  Signs/Symptoms:afib.      COMPARISON:  Chest radiograph 05/08/2022      ACCESSION NUMBER(S):  [id number]      ORDERING CLINICIAN:  HENRY MCGILL      FINDINGS:          CARDIOMEDIASTINAL SILHOUETTE:  Cardiomediastinal silhouette is stable in size and configuration.      LUNGS:  Left basilar atelectasis. No sizable pleural effusion or pneumothorax.      ABDOMEN:  No remarkable upper abdominal findings.      BONES:  No acute osseous abnormality.      Impression: Left basilar atelectasis.      MACRO:  None.      Signed by: Evan Finkelstein 4/6/2024 5:50 AM  Dictation workstation:   [id number]      Physical Exam    General: Alert and oriented x3, pleasant.   Cardiac: Irregular rate and rhythm, S1/S2 , no murmur.   Pulmonary: Clear to auscultation on room air.   Abdomen: Soft, round, nontender. BS +x4.   Extremities: No edema.  Skin: No rashes or lesions.      Relevant Results    Scheduled medications  amLODIPine, 10 mg, oral, Daily  apixaban, 5 mg, oral, BID  carvedilol, 50 mg, oral, BID with meals  dofetilide, 500 mcg, oral, q12h  pantoprazole, 40 mg, oral, Daily before breakfast   Or  pantoprazole, 40 mg, intravenous, Daily before  breakfast  polyethylene glycol, 17 g, oral, Daily  sacubitriL-valsartan, 1 tablet, oral, BID  tamsulosin, 0.4 mg, oral, Daily      Continuous medications     PRN medications  PRN medications: acetaminophen **OR** acetaminophen **OR** acetaminophen, melatonin     Results for orders placed or performed during the hospital encounter of 04/06/24 (from the past 24 hour(s))   CBC and Auto Differential   Result Value Ref Range    WBC 7.0 4.4 - 11.3 x10*3/uL    nRBC 0.0 0.0 - 0.0 /100 WBCs    RBC 4.82 4.50 - 5.90 x10*6/uL    Hemoglobin 14.1 13.5 - 17.5 g/dL    Hematocrit 42.0 41.0 - 52.0 %    MCV 87 80 - 100 fL    MCH 29.3 26.0 - 34.0 pg    MCHC 33.6 32.0 - 36.0 g/dL    RDW 13.0 11.5 - 14.5 %    Platelets 233 150 - 450 x10*3/uL    Neutrophils % 61.6 40.0 - 80.0 %    Immature Granulocytes %, Automated 0.1 0.0 - 0.9 %    Lymphocytes % 22.7 13.0 - 44.0 %    Monocytes % 9.8 2.0 - 10.0 %    Eosinophils % 4.9 0.0 - 6.0 %    Basophils % 0.9 0.0 - 2.0 %    Neutrophils Absolute 4.29 1.60 - 5.50 x10*3/uL    Immature Granulocytes Absolute, Automated 0.01 0.00 - 0.50 x10*3/uL    Lymphocytes Absolute 1.58 0.80 - 3.00 x10*3/uL    Monocytes Absolute 0.68 0.05 - 0.80 x10*3/uL    Eosinophils Absolute 0.34 0.00 - 0.40 x10*3/uL    Basophils Absolute 0.06 0.00 - 0.10 x10*3/uL   Comprehensive metabolic panel   Result Value Ref Range    Glucose 123 (H) 65 - 99 mg/dL    Sodium 136 133 - 145 mmol/L    Potassium 3.9 3.4 - 5.1 mmol/L    Chloride 101 97 - 107 mmol/L    Bicarbonate 25 24 - 31 mmol/L    Urea Nitrogen 14 8 - 25 mg/dL    Creatinine 0.90 0.40 - 1.60 mg/dL    eGFR >90 >60 mL/min/1.73m*2    Calcium 9.4 8.5 - 10.4 mg/dL    Albumin 4.3 3.5 - 5.0 g/dL    Alkaline Phosphatase 61 35 - 125 U/L    Total Protein 7.1 5.9 - 7.9 g/dL    AST 13 5 - 40 U/L    Bilirubin, Total 0.3 0.1 - 1.2 mg/dL    ALT 14 5 - 40 U/L    Anion Gap 10 <=19 mmol/L   Magnesium   Result Value Ref Range    Magnesium 2.10 1.60 - 3.10 mg/dL   NT Pro-BNP   Result Value Ref Range     PROBNP 711 (H) 0 - 229 pg/mL   Serial Troponin, Initial ([facility])   Result Value Ref Range    Troponin T, High Sensitivity 14 <=14 ng/L   Serial Troponin, 2 Hour ([facility])   Result Value Ref Range    Troponin T, High Sensitivity 8 <=14 ng/L   Magnesium   Result Value Ref Range    Magnesium 2.00 1.60 - 3.10 mg/dL   Serial Troponin, 6 Hour ([facility])   Result Value Ref Range    Troponin T, High Sensitivity 9 <=14 ng/L           Assessment/Plan      Atrial Fibrillation  -S/P ablation 3 years ago and being on Tikosyn.   -Cardio follows.   -Now on increased dose of coreg.   -Monitor on telemetry.   -Needs cardioversion, deciding if this will be done outpatient vs inpatient.   -Rate currently controlled.     Cardiomyopathy  -Continue entresto, BB.   -Appears euvolemic at this time.     HTN  -On his home meds plus increased dose of coreg.   -Monitor for hypotension with the recent increase in med.     DVT Risk  -Continue Eliquis.     Plan  Cardiology follows.   Coreg dose was doubled. Monitor BP and HR.   Monitor on telemetry.   Will re-eval later today.   Plan to go home and have outpatient CV vs having done inpatient on Monday per Dr. Willams.         Beth Harkins, APRN-CNP

## 2024-04-06 NOTE — H&P
History Of Present Illness  Alcides Tovar is a 73 y.o. male presenting with palpitations.  This patient with history of atrial fibrillation and ablation approximately 3 years ago developed palpitations while watching TV last night.  Came to the ER EKG demonstrated A-fib workup was otherwise negative.  Case discussed between ER provider and cardiologist decision was made to keep patient overnight for observation cardiology recommended to increase the Coreg dose twice for rate control.  The patient denies any nausea vomiting abdominal pain chest pain shortness of breath fever chills cough leg swelling calf tenderness or any other symptoms     Past Medical History  He has a past medical history of Atrial fibrillation (CMS/HCC) (06/21/2023), BPH (benign prostatic hyperplasia), Cardiomyopathy (CMS/HCC) (01/30/2024), Chronic anticoagulation, Chronic diastolic congestive heart failure (CMS/HCC) (06/21/2023), Hypertension (06/21/2023), Other forms of angina pectoris (CMS/[facility]) (06/21/2023), Palpitations (01/30/2024), Personal history of other diseases of the circulatory system (12/08/2022), Personal history of other endocrine, nutritional and metabolic disease (11/17/2019), Screening for colorectal cancer (03/21/2024), and Syncope (01/30/2024).  Review  Surgical History  He has a past surgical history that includes Other surgical history (05/29/2013) and Colonoscopy (05/29/2013).  Review  Social History  He reports that he has never smoked. He has never been exposed to tobacco smoke. He has never used smokeless tobacco. He reports current alcohol use. He reports that he does not use drugs.  Reviewed  Family History  Family History   Problem Relation Name Age of Onset    Lymphoma Mother      Sudden death Father  44        heart attack        Allergies  Patient has no known allergies.    ROS  Review of Systems   Constitutional: Negative.    HENT: Negative.     Eyes: Negative.    Respiratory: Negative.     Cardiovascular:   Positive for palpitations.   Gastrointestinal: Negative.    Endocrine: Negative.    Genitourinary: Negative.    Musculoskeletal: Negative.    Skin: Negative.    Allergic/Immunologic: Negative.    Neurological: Negative.    Hematological: Negative.    Psychiatric/Behavioral: Negative.     All other systems reviewed and are negative.       Last Recorded Vitals  /87 (BP Location: Right arm, Patient Position: Sitting)   Pulse (!) 112   Temp 36.7 °C (98.1 °F) (Temporal)   Resp 16   Wt 109 kg (239 lb 3.2 oz)   SpO2 98%     Physical Exam  Assessed patient emergency room bed #7.   male who is alert [place] x 3 cooperative no distress  Normocephalic atraumatic EOMI PERRLA  Neck supple no JVD  Chest clear  Heart regular regular S1-S2 distant  Abdomen soft nontender  Extremities no edema good pulses  Neurologic examination gross motor sensor nonfocal  Psych no psychosis    Relevant Results  Reviewed    ASSESSMENT/PLAN  Assessment/Plan   Principal Problem:    A-fib (CMS/HCC)    I reordered his home medications and doubled the Coreg dose as recommended by the cardiologist.  Further recommendations per cardiology, the patient appears stable       Rajni Lau MD

## 2024-04-07 PROBLEM — I48.91 ATRIAL FIBRILLATION WITH RAPID VENTRICULAR RESPONSE (MULTI): Status: ACTIVE | Noted: 2024-04-07

## 2024-04-07 LAB
ALBUMIN SERPL-MCNC: 3.7 G/DL (ref 3.5–5)
ALP BLD-CCNC: 52 U/L (ref 35–125)
ALT SERPL-CCNC: 11 U/L (ref 5–40)
ANION GAP SERPL CALC-SCNC: 9 MMOL/L
AST SERPL-CCNC: 11 U/L (ref 5–40)
BILIRUB SERPL-MCNC: 0.4 MG/DL (ref 0.1–1.2)
BUN SERPL-MCNC: 13 MG/DL (ref 8–25)
CALCIUM SERPL-MCNC: 8.8 MG/DL (ref 8.5–10.4)
CHLORIDE SERPL-SCNC: 107 MMOL/L (ref 97–107)
CO2 SERPL-SCNC: 24 MMOL/L (ref 24–31)
CREAT SERPL-MCNC: 0.9 MG/DL (ref 0.4–1.6)
EGFRCR SERPLBLD CKD-EPI 2021: >90 ML/MIN/1.73M*2
ERYTHROCYTE [DISTWIDTH] IN BLOOD BY AUTOMATED COUNT: 13.1 % (ref 11.5–14.5)
GLUCOSE SERPL-MCNC: 107 MG/DL (ref 65–99)
HCT VFR BLD AUTO: 38.4 % (ref 41–52)
HGB BLD-MCNC: 12.9 G/DL (ref 13.5–17.5)
MCH RBC QN AUTO: 29.3 PG (ref 26–34)
MCHC RBC AUTO-ENTMCNC: 33.6 G/DL (ref 32–36)
MCV RBC AUTO: 87 FL (ref 80–100)
NRBC BLD-RTO: 0 /100 WBCS (ref 0–0)
PLATELET # BLD AUTO: 183 X10*3/UL (ref 150–450)
POTASSIUM SERPL-SCNC: 4 MMOL/L (ref 3.4–5.1)
PROT SERPL-MCNC: 5.9 G/DL (ref 5.9–7.9)
RBC # BLD AUTO: 4.41 X10*6/UL (ref 4.5–5.9)
SODIUM SERPL-SCNC: 140 MMOL/L (ref 133–145)
WBC # BLD AUTO: 6.2 X10*3/UL (ref 4.4–11.3)

## 2024-04-07 PROCEDURE — 36415 COLL VENOUS BLD VENIPUNCTURE: CPT | Performed by: INTERNAL MEDICINE

## 2024-04-07 PROCEDURE — 2500000004 HC RX 250 GENERAL PHARMACY W/ HCPCS (ALT 636 FOR OP/ED): Performed by: INTERNAL MEDICINE

## 2024-04-07 PROCEDURE — 84075 ASSAY ALKALINE PHOSPHATASE: CPT | Performed by: INTERNAL MEDICINE

## 2024-04-07 PROCEDURE — 2500000001 HC RX 250 WO HCPCS SELF ADMINISTERED DRUGS (ALT 637 FOR MEDICARE OP): Performed by: INTERNAL MEDICINE

## 2024-04-07 PROCEDURE — 85027 COMPLETE CBC AUTOMATED: CPT | Performed by: INTERNAL MEDICINE

## 2024-04-07 PROCEDURE — 1200000002 HC GENERAL ROOM WITH TELEMETRY DAILY

## 2024-04-07 PROCEDURE — 99233 SBSQ HOSP IP/OBS HIGH 50: CPT | Performed by: INTERNAL MEDICINE

## 2024-04-07 PROCEDURE — 2500000002 HC RX 250 W HCPCS SELF ADMINISTERED DRUGS (ALT 637 FOR MEDICARE OP, ALT 636 FOR OP/ED): Performed by: INTERNAL MEDICINE

## 2024-04-07 RX ADMIN — APIXABAN 5 MG: 5 TABLET, FILM COATED ORAL at 17:05

## 2024-04-07 RX ADMIN — PANTOPRAZOLE SODIUM 40 MG: 40 TABLET, DELAYED RELEASE ORAL at 06:42

## 2024-04-07 RX ADMIN — CARVEDILOL 50 MG: 25 TABLET, FILM COATED ORAL at 17:05

## 2024-04-07 RX ADMIN — DOFETILIDE 500 MCG: 0.5 CAPSULE ORAL at 20:22

## 2024-04-07 RX ADMIN — CARVEDILOL 50 MG: 25 TABLET, FILM COATED ORAL at 08:33

## 2024-04-07 RX ADMIN — DOFETILIDE 500 MCG: 0.5 CAPSULE ORAL at 08:38

## 2024-04-07 RX ADMIN — AMLODIPINE BESYLATE 10 MG: 10 TABLET ORAL at 08:36

## 2024-04-07 RX ADMIN — APIXABAN 5 MG: 5 TABLET, FILM COATED ORAL at 06:42

## 2024-04-07 RX ADMIN — SACUBITRIL AND VALSARTAN 0.5 TABLET: 97; 103 TABLET, FILM COATED ORAL at 20:24

## 2024-04-07 RX ADMIN — SACUBITRIL AND VALSARTAN 1 TABLET: 97; 103 TABLET, FILM COATED ORAL at 08:37

## 2024-04-07 RX ADMIN — TAMSULOSIN HYDROCHLORIDE 0.4 MG: 0.4 CAPSULE ORAL at 08:37

## 2024-04-07 RX ADMIN — POLYETHYLENE GLYCOL 3350 17 G: 17 POWDER, FOR SOLUTION ORAL at 08:38

## 2024-04-07 ASSESSMENT — PAIN - FUNCTIONAL ASSESSMENT
PAIN_FUNCTIONAL_ASSESSMENT: 0-10
PAIN_FUNCTIONAL_ASSESSMENT: 0-10

## 2024-04-07 ASSESSMENT — PAIN SCALES - GENERAL
PAINLEVEL_OUTOF10: 0 - NO PAIN

## 2024-04-07 NOTE — PROGRESS NOTES
Alcides Tovar is a 73 y.o. male on day 0 of admission presenting with A-fib (CMS/HCC).      Subjective   Patient seen and examined. Sitting up in the chair. States he feels well, denies any chest pain or SOB. No events overnight.        Objective     Last Recorded Vitals  /76 (BP Location: Left arm, Patient Position: Lying)   Pulse 92   Temp 36.1 °C (97 °F) (Temporal)   Resp 17   Wt 104 kg (230 lb)   SpO2 98%   Intake/Output last 3 Shifts:  No intake or output data in the 24 hours ending 04/07/24 0843    Admission Weight  Weight: 109 kg (239 lb 3.2 oz) (04/06/24 0147)    Daily Weight  04/06/24 : 104 kg (230 lb)    Image Results  XR chest 1 view  Narrative: Interpreted By:  Finkelstein, Evan,   STUDY:  XR CHEST 1 VIEW;  4/6/2024 5:45 am      INDICATION:  Signs/Symptoms:afib.      COMPARISON:  Chest radiograph 05/08/2022      ACCESSION NUMBER(S):  [id number]      ORDERING CLINICIAN:  HENRY MCGILL      FINDINGS:          CARDIOMEDIASTINAL SILHOUETTE:  Cardiomediastinal silhouette is stable in size and configuration.      LUNGS:  Left basilar atelectasis. No sizable pleural effusion or pneumothorax.      ABDOMEN:  No remarkable upper abdominal findings.      BONES:  No acute osseous abnormality.      Impression: Left basilar atelectasis.      MACRO:  None.      Signed by: Evan Finkelstein 4/6/2024 5:50 AM  Dictation workstation:   [id number]      Physical Exam    General: Alert and oriented x3, pleasant.   Cardiac: Irregular rate and rhythm, S1/S2 , no murmur.   Pulmonary: Clear to auscultation on room air.   Abdomen: Soft, round, nontender. BS +x4.   Extremities: No edema.  Skin: No rashes or lesions.     Relevant Results    Scheduled medications  amLODIPine, 10 mg, oral, Daily  apixaban, 5 mg, oral, BID  carvedilol, 50 mg, oral, BID with meals  dofetilide, 500 mcg, oral, q12h  pantoprazole, 40 mg, oral, Daily before breakfast   Or  pantoprazole, 40 mg, intravenous, Daily before  breakfast  polyethylene glycol, 17 g, oral, Daily  sacubitriL-valsartan, 1 tablet, oral, BID  tamsulosin, 0.4 mg, oral, Daily      Continuous medications     PRN medications  PRN medications: acetaminophen **OR** acetaminophen **OR** acetaminophen, melatonin     Results for orders placed or performed during the hospital encounter of 04/06/24 (from the past 24 hour(s))   CBC   Result Value Ref Range    WBC 6.2 4.4 - 11.3 x10*3/uL    nRBC 0.0 0.0 - 0.0 /100 WBCs    RBC 4.41 (L) 4.50 - 5.90 x10*6/uL    Hemoglobin 12.9 (L) 13.5 - 17.5 g/dL    Hematocrit 38.4 (L) 41.0 - 52.0 %    MCV 87 80 - 100 fL    MCH 29.3 26.0 - 34.0 pg    MCHC 33.6 32.0 - 36.0 g/dL    RDW 13.1 11.5 - 14.5 %    Platelets 183 150 - 450 x10*3/uL   Comprehensive metabolic panel   Result Value Ref Range    Glucose 107 (H) 65 - 99 mg/dL    Sodium 140 133 - 145 mmol/L    Potassium 4.0 3.4 - 5.1 mmol/L    Chloride 107 97 - 107 mmol/L    Bicarbonate 24 24 - 31 mmol/L    Urea Nitrogen 13 8 - 25 mg/dL    Creatinine 0.90 0.40 - 1.60 mg/dL    eGFR >90 >60 mL/min/1.73m*2    Calcium 8.8 8.5 - 10.4 mg/dL    Albumin 3.7 3.5 - 5.0 g/dL    Alkaline Phosphatase 52 35 - 125 U/L    Total Protein 5.9 5.9 - 7.9 g/dL    AST 11 5 - 40 U/L    Bilirubin, Total 0.4 0.1 - 1.2 mg/dL    ALT 11 5 - 40 U/L    Anion Gap 9 <=19 mmol/L           Assessment/Plan      Atrial Fibrillation  -S/P ablation 3 years ago and being on Tikosyn.   -Cardio follows.   -Now on increased dose of coreg. He did receive lower dose in the evening yetserday due to lower BP, will see cardio recommendations on continuing the higher dose vs backing down.    -Monitor on telemetry.   -Needs cardioversion, deciding if this will be done outpatient vs inpatient. Will know more when patient discusses with cardio this morning.   -Rate currently controlled.      Cardiomyopathy  -Continue entresto, BB.   -Appears euvolemic at this time.      HTN  -On his home meds plus increased dose of coreg.   -Monitor for  hypotension with the recent increase in med.      DVT Risk  -Continue Eliquis.      Plan  Cardiology follows.   Coreg dose was doubled. Monitor BP and HR. Received lower dose last evening per the nurse, will see cardio input on med today.   Monitor on telemetry.   Plan to go home and have outpatient CV vs having done inpatient on Monday per Dr. Wlilams. Will see what decision made once he sees the patient this morning.          Beth Harkins, APRN-CNP

## 2024-04-07 NOTE — PROGRESS NOTES
"Subjective Data:  Patient is still symptomatic.  Feeling dizzy lightheaded.  Pressure running on the low side.    Overnight Events:    Telemetry overnight reviewed no events except for atrial fibrillation.     Objective Data:  Last Recorded Vitals:  Vitals:    04/06/24 2200 04/06/24 2339 04/07/24 0711 04/07/24 1136   BP: 113/66 105/64 114/76 95/52   BP Location: Left arm Left arm Left arm Left arm   Patient Position: Lying  Lying Sitting   Pulse: 84 95 92 86   Resp: 15 16 17 18   Temp: 36.1 °C (97 °F) 36.5 °C (97.7 °F) 36.1 °C (97 °F) 36.3 °C (97.3 °F)   TempSrc: Temporal Temporal Temporal Temporal   SpO2: 98% 96% 98% 96%   Weight:       Height:           Last Labs:  CBC - 4/7/2024:  5:49 AM  6.2 12.9 183    38.4      CMP - 4/7/2024:  5:49 AM  8.8 5.9 11 --- 0.4   _ 3.7 11 52      PTT - No results in last year.  _   _ _     LDLCALC   Date/Time Value Ref Range Status   01/25/2024 08:05 AM 79 <=99 mg/dL Final     Comment:                                 Near   Borderline      AGE      Desirable  Optimal    High     High     Very High     0-19 Y     0 - 109     ---    110-129   >/= 130     ----    20-24 Y     0 - 119     ---    120-159   >/= 160     ----      >24 Y     0 -  99   100-129  130-159   160-189     >/=190       VLDL   Date/Time Value Ref Range Status   01/25/2024 08:05 AM 9 0 - 40 mg/dL Final   01/13/2023 08:15 AM 11 0 - 40 mg/dL Final   12/17/2021 08:22 AM 12 0 - 40 mg/dL Final   11/03/2020 07:43 AM 16 0 - 40 mg/dL Final      Last I/O:  No intake/output data recorded.    Past Cardiology Tests (Last 3 Years):  EKG:  ECG 12 lead (Clinic Performed) 01/15/2024 (Preliminary)    Echo:  No results found for this or any previous visit from the past 1095 days.    Ejection Fractions:  No results found for: \"EF\"  Cath:  No results found for this or any previous visit from the past 1095 days.    Stress Test:  No results found for this or any previous visit from the past 1095 days.    Cardiac Imaging:  No results found " for this or any previous visit from the past 1095 days.      Inpatient Medications:  Scheduled medications   Medication Dose Route Frequency    apixaban  5 mg oral BID    carvedilol  50 mg oral BID with meals    dofetilide  500 mcg oral q12h    pantoprazole  40 mg oral Daily before breakfast    Or    pantoprazole  40 mg intravenous Daily before breakfast    polyethylene glycol  17 g oral Daily    sacubitriL-valsartan  0.5 tablet oral BID    tamsulosin  0.4 mg oral Daily     PRN medications   Medication    acetaminophen    Or    acetaminophen    Or    acetaminophen    melatonin     Continuous Medications   Medication Dose Last Rate       Physical Exam:  General: Patient is in no acute distress.  HEENT: atraumatic normocephalic.  Neck: is supple jugular venous pressure within normal limits no thyromegaly.  Cardiovascular irregular rate and rhythm normal heart sounds no murmurs rubs or gallops.  Lungs: clear to auscultation bilaterally.  Abdomen: is soft nontender.  Extremities warm to touch no edema.          Assessment/Plan   1 atrial fibrillation.  Patient with history of paroxysmal atrial fibrillation status post ablation and on dofetilide follows up with me and Dr. Peterson.  Recommend synchronized cardioversion.  Will arrange for cardioversion in AM.    2.  Hypertension.  Blood pressure and heart rate well-controlled.  Increase carvedilol 50mg oral twice daily for better rate control we will decrease his amlodipine as well as on the Entresto.  I will repeat another 2D echo to make sure his ejection fraction is stable.     3.  Hyperlipidemia continue high intensity statin.     Thank you for allowing to participate in his care  Peripheral IV 04/06/24 20 G Right Antecubital (Active)   Site Assessment Clean;Dry;Intact 04/07/24 0900   Dressing Status Clean;Dry 04/07/24 0900   Number of days: 1       Code Status:  Full Code        Cammie Willams MD

## 2024-04-08 ENCOUNTER — APPOINTMENT (OUTPATIENT)
Dept: POSTOP/PACU | Facility: HOSPITAL | Age: 74
DRG: 309 | End: 2024-04-08
Payer: MEDICARE

## 2024-04-08 ENCOUNTER — ANESTHESIA (OUTPATIENT)
Dept: POSTOP/PACU | Facility: HOSPITAL | Age: 74
DRG: 309 | End: 2024-04-08
Payer: MEDICARE

## 2024-04-08 ENCOUNTER — ANESTHESIA EVENT (OUTPATIENT)
Dept: POSTOP/PACU | Facility: HOSPITAL | Age: 74
DRG: 309 | End: 2024-04-08
Payer: MEDICARE

## 2024-04-08 ENCOUNTER — APPOINTMENT (OUTPATIENT)
Dept: CARDIOLOGY | Facility: HOSPITAL | Age: 74
DRG: 309 | End: 2024-04-08
Payer: MEDICARE

## 2024-04-08 PROCEDURE — A92960 PR CARDIOVERSION, ELECTIVE;EXTERN: Performed by: ANESTHESIOLOGIST ASSISTANT

## 2024-04-08 PROCEDURE — 93306 TTE W/DOPPLER COMPLETE: CPT | Performed by: INTERNAL MEDICINE

## 2024-04-08 PROCEDURE — 2500000001 HC RX 250 WO HCPCS SELF ADMINISTERED DRUGS (ALT 637 FOR MEDICARE OP): Performed by: INTERNAL MEDICINE

## 2024-04-08 PROCEDURE — 3700000002 HC GENERAL ANESTHESIA TIME - EACH INCREMENTAL 1 MINUTE: Performed by: STUDENT IN AN ORGANIZED HEALTH CARE EDUCATION/TRAINING PROGRAM

## 2024-04-08 PROCEDURE — 99100 ANES PT EXTEME AGE<1 YR&>70: CPT | Performed by: STUDENT IN AN ORGANIZED HEALTH CARE EDUCATION/TRAINING PROGRAM

## 2024-04-08 PROCEDURE — 2500000004 HC RX 250 GENERAL PHARMACY W/ HCPCS (ALT 636 FOR OP/ED): Performed by: STUDENT IN AN ORGANIZED HEALTH CARE EDUCATION/TRAINING PROGRAM

## 2024-04-08 PROCEDURE — 1200000002 HC GENERAL ROOM WITH TELEMETRY DAILY

## 2024-04-08 PROCEDURE — 2500000004 HC RX 250 GENERAL PHARMACY W/ HCPCS (ALT 636 FOR OP/ED): Performed by: INTERNAL MEDICINE

## 2024-04-08 PROCEDURE — 5A2204Z RESTORATION OF CARDIAC RHYTHM, SINGLE: ICD-10-PCS | Performed by: INTERNAL MEDICINE

## 2024-04-08 PROCEDURE — A92960 PR CARDIOVERSION, ELECTIVE;EXTERN: Performed by: STUDENT IN AN ORGANIZED HEALTH CARE EDUCATION/TRAINING PROGRAM

## 2024-04-08 PROCEDURE — 99233 SBSQ HOSP IP/OBS HIGH 50: CPT | Performed by: INTERNAL MEDICINE

## 2024-04-08 PROCEDURE — 93306 TTE W/DOPPLER COMPLETE: CPT

## 2024-04-08 PROCEDURE — 3700000001 HC GENERAL ANESTHESIA TIME - INITIAL BASE CHARGE: Performed by: STUDENT IN AN ORGANIZED HEALTH CARE EDUCATION/TRAINING PROGRAM

## 2024-04-08 PROCEDURE — 2500000002 HC RX 250 W HCPCS SELF ADMINISTERED DRUGS (ALT 637 FOR MEDICARE OP, ALT 636 FOR OP/ED): Performed by: INTERNAL MEDICINE

## 2024-04-08 RX ORDER — FENTANYL CITRATE 50 UG/ML
50 INJECTION, SOLUTION INTRAMUSCULAR; INTRAVENOUS EVERY 5 MIN PRN
Status: DISCONTINUED | OUTPATIENT
Start: 2024-04-08 | End: 2024-04-08 | Stop reason: HOSPADM

## 2024-04-08 RX ORDER — PROPOFOL 10 MG/ML
INJECTION, EMULSION INTRAVENOUS AS NEEDED
Status: DISCONTINUED | OUTPATIENT
Start: 2024-04-08 | End: 2024-04-08

## 2024-04-08 RX ORDER — MAGNESIUM SULFATE HEPTAHYDRATE 40 MG/ML
2 INJECTION, SOLUTION INTRAVENOUS ONCE
Status: COMPLETED | OUTPATIENT
Start: 2024-04-08 | End: 2024-04-08

## 2024-04-08 RX ORDER — DILTIAZEM HYDROCHLORIDE 120 MG/1
120 CAPSULE, COATED, EXTENDED RELEASE ORAL DAILY
Status: DISCONTINUED | OUTPATIENT
Start: 2024-04-08 | End: 2024-04-09 | Stop reason: HOSPADM

## 2024-04-08 RX ORDER — METOPROLOL SUCCINATE 50 MG/1
50 TABLET, EXTENDED RELEASE ORAL 2 TIMES DAILY
Status: DISCONTINUED | OUTPATIENT
Start: 2024-04-08 | End: 2024-04-09 | Stop reason: HOSPADM

## 2024-04-08 RX ORDER — FENTANYL CITRATE 50 UG/ML
25 INJECTION, SOLUTION INTRAMUSCULAR; INTRAVENOUS EVERY 5 MIN PRN
Status: DISCONTINUED | OUTPATIENT
Start: 2024-04-08 | End: 2024-04-08 | Stop reason: HOSPADM

## 2024-04-08 RX ORDER — ONDANSETRON HYDROCHLORIDE 2 MG/ML
4 INJECTION, SOLUTION INTRAVENOUS ONCE AS NEEDED
Status: DISCONTINUED | OUTPATIENT
Start: 2024-04-08 | End: 2024-04-08 | Stop reason: HOSPADM

## 2024-04-08 RX ORDER — OXYCODONE HYDROCHLORIDE 5 MG/1
10 TABLET ORAL EVERY 4 HOURS PRN
Status: DISCONTINUED | OUTPATIENT
Start: 2024-04-08 | End: 2024-04-08 | Stop reason: HOSPADM

## 2024-04-08 RX ORDER — OXYCODONE HYDROCHLORIDE 5 MG/1
5 TABLET ORAL EVERY 4 HOURS PRN
Status: DISCONTINUED | OUTPATIENT
Start: 2024-04-08 | End: 2024-04-08 | Stop reason: HOSPADM

## 2024-04-08 RX ORDER — SODIUM CHLORIDE, SODIUM LACTATE, POTASSIUM CHLORIDE, CALCIUM CHLORIDE 600; 310; 30; 20 MG/100ML; MG/100ML; MG/100ML; MG/100ML
100 INJECTION, SOLUTION INTRAVENOUS CONTINUOUS
Status: DISCONTINUED | OUTPATIENT
Start: 2024-04-08 | End: 2024-04-08 | Stop reason: HOSPADM

## 2024-04-08 RX ORDER — ALBUTEROL SULFATE 0.83 MG/ML
2.5 SOLUTION RESPIRATORY (INHALATION) ONCE
Status: DISCONTINUED | OUTPATIENT
Start: 2024-04-08 | End: 2024-04-08 | Stop reason: HOSPADM

## 2024-04-08 RX ORDER — ACETAMINOPHEN 325 MG/1
650 TABLET ORAL EVERY 4 HOURS PRN
Status: DISCONTINUED | OUTPATIENT
Start: 2024-04-08 | End: 2024-04-08 | Stop reason: SDUPTHER

## 2024-04-08 RX ADMIN — APIXABAN 5 MG: 5 TABLET, FILM COATED ORAL at 17:24

## 2024-04-08 RX ADMIN — PANTOPRAZOLE SODIUM 40 MG: 40 TABLET, DELAYED RELEASE ORAL at 06:13

## 2024-04-08 RX ADMIN — APIXABAN 5 MG: 5 TABLET, FILM COATED ORAL at 06:13

## 2024-04-08 RX ADMIN — DILTIAZEM HYDROCHLORIDE 120 MG: 120 CAPSULE, EXTENDED RELEASE ORAL at 10:46

## 2024-04-08 RX ADMIN — DOFETILIDE 500 MCG: 0.5 CAPSULE ORAL at 20:28

## 2024-04-08 RX ADMIN — SODIUM CHLORIDE, POTASSIUM CHLORIDE, SODIUM LACTATE AND CALCIUM CHLORIDE: 600; 310; 30; 20 INJECTION, SOLUTION INTRAVENOUS at 12:16

## 2024-04-08 RX ADMIN — METOPROLOL SUCCINATE 50 MG: 50 TABLET, FILM COATED, EXTENDED RELEASE ORAL at 20:29

## 2024-04-08 RX ADMIN — DOFETILIDE 500 MCG: 0.5 CAPSULE ORAL at 09:59

## 2024-04-08 RX ADMIN — CARVEDILOL 50 MG: 25 TABLET, FILM COATED ORAL at 09:59

## 2024-04-08 RX ADMIN — SACUBITRIL AND VALSARTAN 0.5 TABLET: 97; 103 TABLET, FILM COATED ORAL at 09:59

## 2024-04-08 RX ADMIN — MAGNESIUM SULFATE HEPTAHYDRATE 2 G: 40 INJECTION, SOLUTION INTRAVENOUS at 12:13

## 2024-04-08 RX ADMIN — PROPOFOL 100 MG: 10 INJECTION, EMULSION INTRAVENOUS at 12:20

## 2024-04-08 RX ADMIN — TAMSULOSIN HYDROCHLORIDE 0.4 MG: 0.4 CAPSULE ORAL at 09:59

## 2024-04-08 RX ADMIN — SACUBITRIL AND VALSARTAN 0.5 TABLET: 97; 103 TABLET, FILM COATED ORAL at 20:29

## 2024-04-08 SDOH — HEALTH STABILITY: MENTAL HEALTH: HOW MANY STANDARD DRINKS CONTAINING ALCOHOL DO YOU HAVE ON A TYPICAL DAY?: 1 OR 2

## 2024-04-08 SDOH — SOCIAL STABILITY: SOCIAL INSECURITY: WITHIN THE LAST YEAR, HAVE YOU BEEN HUMILIATED OR EMOTIONALLY ABUSED IN OTHER WAYS BY YOUR PARTNER OR EX-PARTNER?: NO

## 2024-04-08 SDOH — HEALTH STABILITY: MENTAL HEALTH
STRESS IS WHEN SOMEONE FEELS TENSE, NERVOUS, ANXIOUS, OR CAN'T SLEEP AT NIGHT BECAUSE THEIR MIND IS TROUBLED. HOW STRESSED ARE YOU?: NOT AT ALL

## 2024-04-08 SDOH — SOCIAL STABILITY: SOCIAL INSECURITY
WITHIN THE LAST YEAR, HAVE TO BEEN RAPED OR FORCED TO HAVE ANY KIND OF SEXUAL ACTIVITY BY YOUR PARTNER OR EX-PARTNER?: NO

## 2024-04-08 SDOH — SOCIAL STABILITY: SOCIAL NETWORK
IN A TYPICAL WEEK, HOW MANY TIMES DO YOU TALK ON THE PHONE WITH FAMILY, FRIENDS, OR NEIGHBORS?: MORE THAN THREE TIMES A WEEK

## 2024-04-08 SDOH — ECONOMIC STABILITY: FOOD INSECURITY: WITHIN THE PAST 12 MONTHS, YOU WORRIED THAT YOUR FOOD WOULD RUN OUT BEFORE YOU GOT MONEY TO BUY MORE.: NEVER TRUE

## 2024-04-08 SDOH — HEALTH STABILITY: PHYSICAL HEALTH: ON AVERAGE, HOW MANY MINUTES DO YOU ENGAGE IN EXERCISE AT THIS LEVEL?: 40 MIN

## 2024-04-08 SDOH — HEALTH STABILITY: MENTAL HEALTH: HOW OFTEN DO YOU HAVE 6 OR MORE DRINKS ON ONE OCCASION?: NEVER

## 2024-04-08 SDOH — ECONOMIC STABILITY: TRANSPORTATION INSECURITY
IN THE PAST 12 MONTHS, HAS LACK OF TRANSPORTATION KEPT YOU FROM MEETINGS, WORK, OR FROM GETTING THINGS NEEDED FOR DAILY LIVING?: NO

## 2024-04-08 SDOH — SOCIAL STABILITY: SOCIAL NETWORK
DO YOU BELONG TO ANY CLUBS OR ORGANIZATIONS SUCH AS CHURCH GROUPS UNIONS, FRATERNAL OR ATHLETIC GROUPS, OR SCHOOL GROUPS?: NO

## 2024-04-08 SDOH — SOCIAL STABILITY: SOCIAL INSECURITY: WITHIN THE LAST YEAR, HAVE YOU BEEN AFRAID OF YOUR PARTNER OR EX-PARTNER?: NO

## 2024-04-08 SDOH — HEALTH STABILITY: MENTAL HEALTH: HOW OFTEN DO YOU HAVE A DRINK CONTAINING ALCOHOL?: MONTHLY OR LESS

## 2024-04-08 SDOH — ECONOMIC STABILITY: INCOME INSECURITY: IN THE LAST 12 MONTHS, WAS THERE A TIME WHEN YOU WERE NOT ABLE TO PAY THE MORTGAGE OR RENT ON TIME?: NO

## 2024-04-08 SDOH — ECONOMIC STABILITY: FOOD INSECURITY: WITHIN THE PAST 12 MONTHS, THE FOOD YOU BOUGHT JUST DIDN'T LAST AND YOU DIDN'T HAVE MONEY TO GET MORE.: NEVER TRUE

## 2024-04-08 SDOH — SOCIAL STABILITY: SOCIAL NETWORK: HOW OFTEN DO YOU ATTEND CHURCH OR RELIGIOUS SERVICES?: MORE THAN 4 TIMES PER YEAR

## 2024-04-08 SDOH — SOCIAL STABILITY: SOCIAL NETWORK: ARE YOU MARRIED, WIDOWED, DIVORCED, SEPARATED, NEVER MARRIED, OR LIVING WITH A PARTNER?: MARRIED

## 2024-04-08 SDOH — ECONOMIC STABILITY: INCOME INSECURITY: HOW HARD IS IT FOR YOU TO PAY FOR THE VERY BASICS LIKE FOOD, HOUSING, MEDICAL CARE, AND HEATING?: NOT HARD AT ALL

## 2024-04-08 SDOH — SOCIAL STABILITY: SOCIAL INSECURITY
WITHIN THE LAST YEAR, HAVE YOU BEEN KICKED, HIT, SLAPPED, OR OTHERWISE PHYSICALLY HURT BY YOUR PARTNER OR EX-PARTNER?: NO

## 2024-04-08 SDOH — SOCIAL STABILITY: SOCIAL NETWORK: HOW OFTEN DO YOU ATTENT MEETINGS OF THE CLUB OR ORGANIZATION YOU BELONG TO?: NEVER

## 2024-04-08 SDOH — HEALTH STABILITY: PHYSICAL HEALTH: ON AVERAGE, HOW MANY DAYS PER WEEK DO YOU ENGAGE IN MODERATE TO STRENUOUS EXERCISE (LIKE A BRISK WALK)?: 3 DAYS

## 2024-04-08 SDOH — ECONOMIC STABILITY: HOUSING INSECURITY
IN THE LAST 12 MONTHS, WAS THERE A TIME WHEN YOU DID NOT HAVE A STEADY PLACE TO SLEEP OR SLEPT IN A SHELTER (INCLUDING NOW)?: NO

## 2024-04-08 SDOH — SOCIAL STABILITY: SOCIAL NETWORK: HOW OFTEN DO YOU GET TOGETHER WITH FRIENDS OR RELATIVES?: MORE THAN THREE TIMES A WEEK

## 2024-04-08 SDOH — ECONOMIC STABILITY: INCOME INSECURITY: IN THE PAST 12 MONTHS, HAS THE ELECTRIC, GAS, OIL, OR WATER COMPANY THREATENED TO SHUT OFF SERVICE IN YOUR HOME?: NO

## 2024-04-08 SDOH — ECONOMIC STABILITY: TRANSPORTATION INSECURITY
IN THE PAST 12 MONTHS, HAS THE LACK OF TRANSPORTATION KEPT YOU FROM MEDICAL APPOINTMENTS OR FROM GETTING MEDICATIONS?: NO

## 2024-04-08 SDOH — ECONOMIC STABILITY: HOUSING INSECURITY: IN THE LAST 12 MONTHS, HOW MANY PLACES HAVE YOU LIVED?: 1

## 2024-04-08 ASSESSMENT — COGNITIVE AND FUNCTIONAL STATUS - GENERAL
MOBILITY SCORE: 24
DAILY ACTIVITIY SCORE: 24
MOBILITY SCORE: 24
DAILY ACTIVITIY SCORE: 24

## 2024-04-08 ASSESSMENT — PAIN - FUNCTIONAL ASSESSMENT
PAIN_FUNCTIONAL_ASSESSMENT: 0-10

## 2024-04-08 ASSESSMENT — PAIN SCALES - GENERAL
PAINLEVEL_OUTOF10: 0 - NO PAIN

## 2024-04-08 ASSESSMENT — ACTIVITIES OF DAILY LIVING (ADL): LACK_OF_TRANSPORTATION: NO

## 2024-04-08 ASSESSMENT — LIFESTYLE VARIABLES
AUDIT-C TOTAL SCORE: 1
SKIP TO QUESTIONS 9-10: 1

## 2024-04-08 NOTE — ADDENDUM NOTE
Addendum  created 04/08/24 1241 by Nika Chavez MD    Clinical Note Signed, Flowsheet accepted, Intraprocedure Event deleted, Intraprocedure Event edited, Intraprocedure Flowsheets edited, Intraprocedure Staff edited

## 2024-04-08 NOTE — ANESTHESIA POSTPROCEDURE EVALUATION
Patient: lAcides Tovar    Procedure Summary       Date: 04/08/24 Room / Location: [facility]    Anesthesia Start: 1216 Anesthesia Stop: 1227    Procedure: CARDIOVERSION EXTERNAL Diagnosis: Paroxysmal atrial fibrillation (CMS/HCC)    Scheduled Providers:  Responsible Provider: Nika Chavez MD    Anesthesia Type: MAC ASA Status: 3            Anesthesia Type: MAC    Vitals Value Taken Time   /98 04/08/24 1216   Temp 36 04/08/24 1227   Pulse 93 04/08/24 1218   Resp 10 04/08/24 1218   SpO2 99 % 04/08/24 1218   Vitals shown include unvalidated device data.    Anesthesia Post Evaluation    Patient location during evaluation: bedside  Patient participation: complete - patient participated  Level of consciousness: awake and alert  Pain management: adequate  Multimodal analgesia pain management approach  Airway patency: patent  Cardiovascular status: acceptable  Respiratory status: acceptable  Hydration status: acceptable  Postoperative Nausea and Vomiting: none        There were no known notable events for this encounter.

## 2024-04-08 NOTE — ANESTHESIA PREPROCEDURE EVALUATION
Patient: Alcides Tovar    Procedure Information       Date/Time: 04/08/24 1200    Procedure: CARDIOVERSION EXTERNAL    Location: [facility]            Relevant Problems   Cardiac   (+) A-fib (CMS/[facility])   (+) Atrial fibrillation (CMS/[facility])   (+) Atrial fibrillation with rapid ventricular response (CMS/[facility])   (+) Chronic diastolic congestive heart failure (CMS/HCC)   (+) Hypertension   (+) Other forms of angina pectoris (CMS/[facility])     Past Medical History:   Diagnosis Date    Atrial fibrillation (CMS/HCC) 06/21/2023    Hx of ablation    BPH (benign prostatic hyperplasia)     Cardiomyopathy (CMS/[facility]) 01/30/2024    Cammie Willams MD  Physician : Cardiology    Chronic anticoagulation     Eliquis    Chronic diastolic congestive heart failure (CMS/[facility]) 06/21/2023    Hypertension 06/21/2023    Other forms of angina pectoris (CMS/[facility]) 06/21/2023    Palpitations 01/30/2024    Personal history of other diseases of the circulatory system 12/08/2022    History of atrial fibrillation    Personal history of other endocrine, nutritional and metabolic disease 11/17/2019    History of hyperglycemia    Screening for colorectal cancer 03/21/2024    Syncope 01/30/2024     Past Surgical History:   Procedure Laterality Date    COLONOSCOPY  05/29/2013    Complete Colonoscopy    OTHER SURGICAL HISTORY  05/29/2013    Stress Test ECG Performed     No Known Allergies    Clinical information reviewed:                   NPO Detail:  No data recorded     Physical Exam    Airway  Mallampati: I  TM distance: >3 FB  Neck ROM: full     Cardiovascular   Rhythm: irregular  Rate: abnormal     Dental    Pulmonary   Breath sounds clear to auscultation     Abdominal            Anesthesia Plan    History of general anesthesia?: yes  History of complications of general anesthesia?: no    ASA 3     MAC     intravenous induction   Postoperative administration of opioids is intended.  Anesthetic plan and risks discussed with patient.  Use  of blood products discussed with patient who.

## 2024-04-08 NOTE — CARE PLAN
The patient's goals for the shift include  stay here for cardioversion.    The clinical goals for the shift include maintain hemodynamic stability.    Over the shift, the patient did not make progress toward the following goals. Barriers to progression include none. Recommendations to address these barriers include n/a.

## 2024-04-08 NOTE — CARE PLAN
Pt does not have a POA or Living Will  ADOD: 1 day  Pt lives at home with his wife; he is independent with ADL's   He drives and can care for himself  He does not use home 02, no cpap or bipap; he does not use any assistive device when ambulating.  He wears glasses, no hearing aids.  His Clinician is TONEY cardona from ; he uses [facility] on [address] in [place] for his meds  He is here for AFIB-Cardioversion today at noon   DISCHARGE PLAN: HOME WITH WIFE

## 2024-04-08 NOTE — PROGRESS NOTES
Alcides Tovar is a 73 y.o. male on day 1 of admission presenting with A-fib (CMS/HCC).      Subjective   Patient resting in bed comfortably, denies pain or sob.       Objective     Last Recorded Vitals  /68 (BP Location: Left arm, Patient Position: Sitting)   Pulse 61   Temp 35.7 °C (96.3 °F) (Temporal)   Resp 12   Wt 104 kg (230 lb)   SpO2 97%   Intake/Output last 3 Shifts:    Intake/Output Summary (Last 24 hours) at 4/8/2024 1402  Last data filed at 4/8/2024 1225  Gross per 24 hour   Intake 340 ml   Output --   Net 340 ml       Admission Weight  Weight: 109 kg (239 lb 3.2 oz) (04/06/24 0147)    Daily Weight  04/06/24 : 104 kg (230 lb)    Image Results  XR chest 1 view  Narrative: Interpreted By:  Finkelstein, Evan,   STUDY:  XR CHEST 1 VIEW;  4/6/2024 5:45 am      INDICATION:  Signs/Symptoms:afib.      COMPARISON:  Chest radiograph 05/08/2022      ACCESSION NUMBER(S):  [id number]      ORDERING CLINICIAN:  HENRY MCGILL      FINDINGS:          CARDIOMEDIASTINAL SILHOUETTE:  Cardiomediastinal silhouette is stable in size and configuration.      LUNGS:  Left basilar atelectasis. No sizable pleural effusion or pneumothorax.      ABDOMEN:  No remarkable upper abdominal findings.      BONES:  No acute osseous abnormality.      Impression: Left basilar atelectasis.      MACRO:  None.      Signed by: Evan Finkelstein 4/6/2024 5:50 AM  Dictation workstation:   [id number]      Physical Exam  Constitutional:       Appearance: Normal appearance.   Cardiovascular:      Rate and Rhythm: Normal rate and regular rhythm.      Pulses: Normal pulses.      Heart sounds: Normal heart sounds.   Pulmonary:      Effort: Pulmonary effort is normal.      Breath sounds: Normal breath sounds.   Abdominal:      General: Bowel sounds are normal.      Palpations: Abdomen is soft.   Musculoskeletal:         General: Normal range of motion.   Skin:     General: Skin is warm and dry.   Neurological:      Mental Status: He is  alert and oriented to person, place, and time.         Relevant Results             Results for orders placed or performed during the hospital encounter of 04/06/24 (from the past 24 hour(s))   Transthoracic Echo (TTE) Complete   Result Value Ref Range    BSA 2.35 m2       Assessment/Plan                  Principal Problem:    A-fib (CMS/[facility])  Active Problems:    Atrial fibrillation with rapid ventricular response (CMS/[facility])    Atrial Fibrillation  S/P ablation 3 years ago and being on Tikosyn.   Cardio following  -addendum cardioversion done at 12:30 pm with sinus rhythm result  Now on increased dose of coreg.     Monitor on telemetry.      Cardiomyopathy  Continue entresto, BB.   Appears euvolemic at this time.      HTN  On his home meds plus increased dose of coreg.   Monitor for hypotension with the recent increase in med.      DVT Risk  Continue Eliquis.      Plan  Dr Connelly would like to monitor telemetry overnight with anticipation of discharge tomorrow.     Plan of care discussed with patient and collaborating physician, Dr. Sabillon.            Pattie Murphy, CLAUDIA-CNP

## 2024-04-08 NOTE — PROGRESS NOTES
04/08/24 1116   [facility] Disability Status   Are you deaf or do you have serious difficulty hearing? N   Are you blind or do you have serious difficulty seeing, even when wearing glasses? N   Because of a physical, mental, or emotional condition, do you have serious difficulty concentrating, remembering, or making decisions? (5 years old or older) N   Do you have serious difficulty walking or climbing stairs? N   Do you have serious difficulty dressing or bathing? N   Because of a physical, mental, or emotional condition, do you have serious difficulty doing errands alone such as visiting the doctor? N

## 2024-04-08 NOTE — PROGRESS NOTES
Subjective Data:  72-year-old male patient with history of cardiomyopathy ejection fraction last time was about 35 to 40% with recurrence of A-fib RVR patient had an ablation in the past now with the dofetilide and carvedilol.  While watching television patient had an episode of A-fib RVR no active angina or CHF signs symptoms.  So far stable cardiac wise upcoming cardioversion today.  Overnight Events:    None  Objective   Last Recorded Vitals  /68 (BP Location: Left arm, Patient Position: Sitting)   Pulse 61   Temp 35.7 °C (96.3 °F) (Temporal)   Resp 12   Wt 104 kg (230 lb)   SpO2 97%     Intake/Output Summary (Last 24 hours) at 4/8/2024 1133  Last data filed at 4/7/2024 2000  Gross per 24 hour   Intake 240 ml   Output --   Net 240 ml     Physical Exam:  HEENT: Normocephalic/atraumatic pupils equal react light  Neck exam mild JVD, no bruit  Lung exam clear to auscultation, few crackles at the bases  Cardiac exam is irregular rhythm S1-S2, soft systolic murmur heard.   Abdomen soft nontender, nondistended  Extremities no clubbing, cyanosis, trace edema  Neuro exam grossly intact.  Image Results  XR chest 1 view  Narrative: Interpreted By:  Finkelstein, Evan,   STUDY:  XR CHEST 1 VIEW;  4/6/2024 5:45 am      INDICATION:  Signs/Symptoms:afib.      COMPARISON:  Chest radiograph 05/08/2022      ACCESSION NUMBER(S):  [id number]      ORDERING CLINICIAN:  HENRY MCGILL      FINDINGS:          CARDIOMEDIASTINAL SILHOUETTE:  Cardiomediastinal silhouette is stable in size and configuration.      LUNGS:  Left basilar atelectasis. No sizable pleural effusion or pneumothorax.      ABDOMEN:  No remarkable upper abdominal findings.      BONES:  No acute osseous abnormality.      Impression: Left basilar atelectasis.      MACRO:  None.      Signed by: Evan Finkelstein 4/6/2024 5:50 AM  Dictation workstation:   [id number]    Last Labs:  CBC - 4/7/2024:  5:49 AM  6.2 12.9 183    38.4      CMP - 4/7/2024:  5:49  AM  8.8 5.9 11 --- 0.4   _ 3.7 11 52      PTT - No results in last year.  _   _ _     Inpatient Medications:  Scheduled medications   Medication Dose Route Frequency    apixaban  5 mg oral BID    carvedilol  50 mg oral BID with meals    dilTIAZem CD  120 mg oral Daily    dofetilide  500 mcg oral q12h    pantoprazole  40 mg oral Daily before breakfast    Or    pantoprazole  40 mg intravenous Daily before breakfast    polyethylene glycol  17 g oral Daily    sacubitriL-valsartan  0.5 tablet oral BID    tamsulosin  0.4 mg oral Daily     Principal Problem:    A-fib (CMS/HCC)  Active Problems:    Atrial fibrillation with rapid ventricular response (CMS/[facility])    Assessment/Plan   73-year-old male patient with history of cardiomyopathy, ejection fraction by 35 to 40%, now with A-fib RVR.  Patient BMP is unremarkable.  Hemoglobin 12.9.  1.  A-fib RVR: Continue rate control education including dofetilide and carvedilol.  Will discontinue carvedilol and start patients on the Toprol-XL at low-dose of Cardizem for better rate control.  QT interval is within normal range at the moment.  Pending cardioversion today.  Continue current Eliquis.  2.  Cardiomyopathy: Patient underlying with a fraction about 40%.  Continue Entresto.  Pending repeat echocardiogram.  Modify risk factor.  Pt. care time is spent includes review of diagnostic tests, labs, radiographs, EKGs, old echoes, cardiac work-up and coordination of care. Assessment, impression and plans are reflected in the note above as well as the orders.    Code Status:  Full Code  I spent 35 minutes in the professional and overall care of this patient.  Zack Connelly MD

## 2024-04-08 NOTE — NURSING NOTE
Patient arrived to room 455 at this time. Alert and oriented to room. Ambulating independently, wife at bedside. Call light and personal belongings within reach.

## 2024-04-08 NOTE — PROGRESS NOTES
04/08/24 1115   Discharge Planning   Living Arrangements Spouse/significant other   Support Systems Spouse/significant other   Type of Residence Private residence   Who is requesting discharge planning? Provider   Home or Post Acute Services None   Patient expects to be discharged to: HOME   Does the patient need discharge transport arranged? No   Financial Resource Strain   How hard is it for you to pay for the very basics like food, housing, medical care, and heating? Not hard   Housing Stability   In the last 12 months, was there a time when you were not able to pay the mortgage or rent on time? N   In the last 12 months, how many places have you lived? 1   In the last 12 months, was there a time when you did not have a steady place to sleep or slept in a shelter (including now)? N   Transportation Needs   In the past 12 months, has lack of transportation kept you from medical appointments or from getting medications? no   In the past 12 months, has lack of transportation kept you from meetings, work, or from getting things needed for daily living? No   Patient Choice   Patient / Family choosing to utilize agency / facility established prior to hospitalization No

## 2024-04-08 NOTE — PROGRESS NOTES
04/08/24 1113   Physical Activity   On average, how many days per week do you engage in moderate to strenuous exercise (like a brisk walk)? 3 days   On average, how many minutes do you engage in exercise at this level? 40 min   Financial Resource Strain   How hard is it for you to pay for the very basics like food, housing, medical care, and heating? Not hard   Housing Stability   In the last 12 months, was there a time when you were not able to pay the mortgage or rent on time? N   In the last 12 months, how many places have you lived? 1   In the last 12 months, was there a time when you did not have a steady place to sleep or slept in a shelter (including now)? N   Transportation Needs   In the past 12 months, has lack of transportation kept you from medical appointments or from getting medications? no   In the past 12 months, has lack of transportation kept you from meetings, work, or from getting things needed for daily living? No   Food Insecurity   Within the past 12 months, you worried that your food would run out before you got the money to buy more. Never true   Within the past 12 months, the food you bought just didn't last and you didn't have money to get more. Never true   Stress   Do you feel stress - tense, restless, nervous, or anxious, or unable to sleep at night because your mind is troubled all the time - these days? Not at all   Social Connections   In a typical week, how many times do you talk on the phone with family, friends, or neighbors? More than 3   How often do you get together with friends or relatives? More than 3   How often do you attend Adventist or Sikh services? More than 4   Do you belong to any clubs or organizations such as Adventist groups, unions, fraternal or athletic groups, or school groups? No   How often do you attend meetings of the clubs or organizations you belong to? Never   Are you , , , , never , or living with a partner?     Intimate Partner Violence   Within the last year, have you been afraid of your partner or ex-partner? No   Within the last year, have you been humiliated or emotionally abused in other ways by your partner or ex-partner? No   Within the last year, have you been kicked, hit, slapped, or otherwise physically hurt by your partner or ex-partner? No   Within the last year, have you been raped or forced to have any kind of sexual activity by your partner or ex-partner? No   Alcohol Use   Q1: How often do you have a drink containing alcohol? Monthly or l   Q2: How many drinks containing alcohol do you have on a typical day when you are drinking? 1 or 2   Q3: How often do you have six or more drinks on one occasion? Never   Utilities   In the past 12 months has the electric, gas, oil, or water company threatened to shut off services in your home? No

## 2024-04-08 NOTE — PROGRESS NOTES
04/08/24 1116   Current Planned Discharge Disposition   Current Planned Discharge Disposition Home

## 2024-04-09 VITALS
BODY MASS INDEX: 28.6 KG/M2 | WEIGHT: 230 LBS | HEIGHT: 75 IN | TEMPERATURE: 97.9 F | DIASTOLIC BLOOD PRESSURE: 72 MMHG | RESPIRATION RATE: 14 BRPM | HEART RATE: 62 BPM | SYSTOLIC BLOOD PRESSURE: 120 MMHG | OXYGEN SATURATION: 97 %

## 2024-04-09 LAB
AORTIC VALVE MEAN GRADIENT: 1 MMHG
AORTIC VALVE PEAK VELOCITY: 0.79 M/S
AV PEAK GRADIENT: 2.5 MMHG
AVA (PEAK VEL): 4.82 CM2
AVA (VTI): 6.48 CM2
EJECTION FRACTION APICAL 4 CHAMBER: 55.4
LEFT ATRIUM VOLUME AREA LENGTH INDEX BSA: 19.5 ML/M2
LEFT VENTRICLE INTERNAL DIMENSION DIASTOLE: 5.23 CM (ref 3.5–6)
LEFT VENTRICULAR OUTFLOW TRACT DIAMETER: 2.5 CM
LV EJECTION FRACTION BIPLANE: 56 %
MITRAL VALVE E/E' RATIO: 4.71
RIGHT VENTRICLE FREE WALL PEAK S': 15.8 CM/S
TRICUSPID ANNULAR PLANE SYSTOLIC EXCURSION: 1.7 CM

## 2024-04-09 PROCEDURE — 2500000001 HC RX 250 WO HCPCS SELF ADMINISTERED DRUGS (ALT 637 FOR MEDICARE OP): Performed by: INTERNAL MEDICINE

## 2024-04-09 PROCEDURE — 2500000002 HC RX 250 W HCPCS SELF ADMINISTERED DRUGS (ALT 637 FOR MEDICARE OP, ALT 636 FOR OP/ED): Performed by: INTERNAL MEDICINE

## 2024-04-09 PROCEDURE — 99232 SBSQ HOSP IP/OBS MODERATE 35: CPT | Performed by: INTERNAL MEDICINE

## 2024-04-09 RX ORDER — DILTIAZEM HYDROCHLORIDE 120 MG/1
120 CAPSULE, EXTENDED RELEASE ORAL DAILY
Qty: 30 CAPSULE | Refills: 0 | Status: SHIPPED | OUTPATIENT
Start: 2024-04-10 | End: 2024-05-01 | Stop reason: ALTCHOICE

## 2024-04-09 RX ORDER — METOPROLOL SUCCINATE 50 MG/1
50 TABLET, EXTENDED RELEASE ORAL 2 TIMES DAILY
Qty: 60 TABLET | Refills: 0 | Status: SHIPPED | OUTPATIENT
Start: 2024-04-09 | End: 2024-05-01 | Stop reason: ALTCHOICE

## 2024-04-09 RX ORDER — ACETAMINOPHEN 325 MG/1
650 TABLET ORAL EVERY 4 HOURS PRN
Qty: 30 TABLET | Refills: 0
Start: 2024-04-09 | End: 2024-05-16 | Stop reason: ALTCHOICE

## 2024-04-09 RX ORDER — TALC
3 POWDER (GRAM) TOPICAL NIGHTLY PRN
Refills: 0
Start: 2024-04-09 | End: 2024-05-16 | Stop reason: ALTCHOICE

## 2024-04-09 RX ORDER — DOFETILIDE 0.5 MG/1
500 CAPSULE ORAL EVERY 12 HOURS
Start: 2024-04-09

## 2024-04-09 RX ADMIN — APIXABAN 5 MG: 5 TABLET, FILM COATED ORAL at 05:02

## 2024-04-09 RX ADMIN — PANTOPRAZOLE SODIUM 40 MG: 40 TABLET, DELAYED RELEASE ORAL at 05:02

## 2024-04-09 RX ADMIN — DILTIAZEM HYDROCHLORIDE 120 MG: 120 CAPSULE, EXTENDED RELEASE ORAL at 08:57

## 2024-04-09 RX ADMIN — SACUBITRIL AND VALSARTAN 0.5 TABLET: 97; 103 TABLET, FILM COATED ORAL at 08:57

## 2024-04-09 RX ADMIN — DOFETILIDE 500 MCG: 0.5 CAPSULE ORAL at 08:57

## 2024-04-09 RX ADMIN — METOPROLOL SUCCINATE 50 MG: 50 TABLET, FILM COATED, EXTENDED RELEASE ORAL at 08:57

## 2024-04-09 RX ADMIN — TAMSULOSIN HYDROCHLORIDE 0.4 MG: 0.4 CAPSULE ORAL at 08:57

## 2024-04-09 ASSESSMENT — COGNITIVE AND FUNCTIONAL STATUS - GENERAL
MOBILITY SCORE: 24
DAILY ACTIVITIY SCORE: 24

## 2024-04-09 ASSESSMENT — PAIN SCALES - GENERAL
PAINLEVEL_OUTOF10: 0 - NO PAIN
PAINLEVEL_OUTOF10: 0 - NO PAIN

## 2024-04-09 ASSESSMENT — PAIN - FUNCTIONAL ASSESSMENT: PAIN_FUNCTIONAL_ASSESSMENT: 0-10

## 2024-04-09 NOTE — CARE PLAN
Problem: Pain  Goal: My pain/discomfort is manageable  Outcome: Progressing     Problem: Safety  Goal: Patient will be injury free during hospitalization  Outcome: Progressing  Goal: I will remain free of falls  Outcome: Progressing     Problem: Daily Care  Goal: Daily care needs are met  Outcome: Progressing     Problem: Psychosocial Needs  Goal: Demonstrates ability to cope with hospitalization/illness  Outcome: Progressing  Goal: Collaborate with me, my family, and caregiver to identify my specific goals  Outcome: Progressing     Problem: Discharge Barriers  Goal: My discharge needs are met  Outcome: Progressing     Problem: Pain - Adult  Goal: Verbalizes/displays adequate comfort level or baseline comfort level  Outcome: Progressing     Problem: Safety - Adult  Goal: Free from fall injury  Outcome: Progressing     Problem: Discharge Planning  Goal: Discharge to home or other facility with appropriate resources  Outcome: Progressing     Problem: Chronic Conditions and Co-morbidities  Goal: Patient's chronic conditions and co-morbidity symptoms are monitored and maintained or improved  Outcome: Progressing   The patient's goals for the shift include      The clinical goals for the shift include Maintain hemodynamic stability    Over the shift, the patient did not make progress toward the following goals. Barriers to progression include cardiac changes. Recommendations to address these barriers include tele monitoring.

## 2024-04-09 NOTE — PROGRESS NOTES
Subjective Data:  Patient seen and examined.  Status post cardioversion yesterday with anesthesiologist present.  Patient back in normal sinus rhythm.  Currently in a controlled rate of sinus rhythm.  They including dofetilide, Toprol, diltiazem low-dose.  Patient asymptomatic no dizziness lightheadedness.  Wants to go home.  Outpatient cardiac rhythm monitoring.  Overnight Events:    None  Objective   Last Recorded Vitals  /72 (BP Location: Left arm, Patient Position: Lying)   Pulse 62   Temp 36.6 °C (97.9 °F) (Temporal)   Resp 14   Wt 104 kg (230 lb)   SpO2 97%     Intake/Output Summary (Last 24 hours) at 4/9/2024 1034  Last data filed at 4/8/2024 2142  Gross per 24 hour   Intake 117.5 ml   Output --   Net 117.5 ml     Physical Exam:  HEENT: Normocephalic/atraumatic pupils equal react light  Neck exam mild JVD, no bruit  Lung exam clear to auscultation, few crackles at the bases  Cardiac exam is regular rhythm S1-S2, no systolic murmur heard.   Abdomen soft nontender, nondistended  Extremities no clubbing, cyanosis, edema  Neuro exam grossly intact.  Image Results  Transthoracic Echo (TTE) [place] [address]              Phone [phone]    TRANSTHORACIC ECHOCARDIOGRAM REPORT       Patient Name:     PADMINI Barbosa Physician:   Yanely Willams MD  Study Date:       4/8/2024             Ordering Provider:   Yanely WILLAMS  MRN/PID:          [id number]             Fellow:  Accession#:       [id number]         Nurse:  Date of           1950 / 73 years Sonographer:         Pattie Woodall  Birth/Age:  Gender:           M                    Additional Staff:    Rosi Tamayo RDCS  Height:           190.50 cm            Admit Date:  Weight:           104.33 kg            Admission Status:    Inpatient - Routine  BSA / BMI:        2.33 m2 / 28.75       Department Location: [facility]                    kg/m2  Blood Pressure: 124 /76 mmHg    Study Type:    TRANSTHORACIC ECHO (TTE) COMPLETE  Diagnosis/ICD: Paroxysmal atrial fibrillation-I48.0  Indication:    AFib  CPT Codes:     Echo Complete w Full [id number]    Patient History:  Pertinent History: Afib, CHF, HTN, angina, cardiomyopathy, palpitations,                     syncope.    Study Detail: The following Echo studies were performed: 2D, M-Mode, Doppler and                color flow.       PHYSICIAN INTERPRETATION:  Left Ventricle: Left ventricular systolic function is normal, with an estimated ejection fraction of 55-60%. There are no regional wall motion abnormalities. The left ventricular cavity size is normal. Left ventricular diastolic filling was indeterminate.  Left Atrium: The left atrium is normal in size.  Right Ventricle: The right ventricle is normal in size. There is normal right ventricular global systolic function.  Right Atrium: The right atrium is normal in size.  Aortic Valve: The aortic valve appears structurally normal. There is mild aortic valve regurgitation. The peak instantaneous gradient of the aortic valve is 2.5 mmHg. The mean gradient of the aortic valve is 1.0 mmHg.  Mitral Valve: The mitral valve is normal in structure. There is no evidence of mitral valve regurgitation.  Tricuspid Valve: The tricuspid valve is structurally normal. There is mild tricuspid regurgitation. The right ventricular systolic pressure is unable to be estimated.  Pulmonic Valve: The pulmonic valve is structurally normal. There is no indication of pulmonic valve regurgitation.  Pericardium: There is no pericardial effusion noted.  Aorta: The aortic root is normal.  Systemic Veins: The inferior vena cava appears to be of normal size. There is IVC inspiratory collapse greater than 50%.       CONCLUSIONS:   1. Left ventricular systolic function is normal with a 55-60% estimated ejection fraction.   2. No  evidence of mitral valve regurgitation.   3. Mild tricuspid regurgitation is visualized.   4. Mild aortic valve regurgitation.   5. Patient in atrial fibrillation.    QUANTITATIVE DATA SUMMARY:  2D MEASUREMENTS:                           Normal Ranges:  LAs:           3.60 cm   (2.7-4.0cm)  IVSd:          0.86 cm   (0.6-1.1cm)  LVPWd:         0.98 cm   (0.6-1.1cm)  LVIDd:         5.23 cm   (3.9-5.9cm)  LVIDs:         3.25 cm  LV Mass Index: 75.0 g/m2  LV % FS        37.9 %    LA VOLUME:                                Normal Ranges:  LA Vol A4C:        42.0 ml    (22+/-6mL/m2)  LA Vol A2C:        47.8 ml  LA Vol BP:         45.4 ml  LA Vol Index A4C:  18.0ml/m2  LA Vol Index A2C:  20.5 ml/m2  LA Vol Index BP:   19.5 ml/m2  LA Area A4C:       17.1 cm2  LA Area A2C:       18.0 cm2  LA Major Axis A4C: 5.9 cm  LA Major Axis A2C: 5.8 cm  LA Volume Index:   18.1 ml/m2  LA Vol A4C:        38.9 ml  LA Vol A2C:        44.6 ml    RA VOLUME BY A/L METHOD:                                Normal Ranges:  RA Vol A4C:        36.0 ml    (8.3-19.5ml)  RA Vol Index A4C:  15.5 ml/m2  RA Area A4C:       15.8 cm2  RA Major Axis A4C: 5.9 cm    M-MODE MEASUREMENTS:                   Normal Ranges:  Ao Root: 2.70 cm (2.0-3.7cm)  LAs:     3.20 cm (2.7-4.0cm)    AORTA MEASUREMENTS:                     Normal Ranges:  Asc Ao, d: 3.60 cm (2.1-3.4cm)    LV SYSTOLIC FUNCTION BY 2D PLANIMETRY (MOD):                      Normal Ranges:  EF-A4C View: 55.4 % (>=55%)  EF-A2C View: 55.9 %  EF-Biplane:  56.1 %    LV DIASTOLIC FUNCTION:                         Normal Ranges:  MV Peak E:    0.75 m/s (0.7-1.2 m/s)  MV e'         0.16 m/s (>8.0)  MV lateral e' 0.16 m/s  MV medial e'  0.08 m/s  E/e' Ratio:   4.71     (<8.0)    MITRAL VALVE:                  Normal Ranges:  MV DT: 170 msec (150-240msec)    AORTIC VALVE:                                    Normal Ranges:  AoV Vmax:                0.79 m/s (<=1.7m/s)  AoV Peak P.5 mmHg  (<20mmHg)  AoV Mean P.0 mmHg (1.7-11.5mmHg)  LVOT Max Yovani:            0.77 m/s (<=1.1m/s)  AoV VTI:                 11.90 cm (18-25cm)  LVOT VTI:                15.70 cm  LVOT Diameter:           2.50 cm  (1.8-2.4cm)  AoV Area, VTI:           6.48 cm2 (2.5-5.5cm2)  AoV Area,Vmax:           4.82 cm2 (2.5-4.5cm2)  AoV Dimensionless Index: 1.32       RIGHT VENTRICLE:  RV Basal 3.71 cm  RV Mid   3.02 cm  RV Major 8.2 cm  TAPSE:   16.8 mm  RV s'    0.16 m/s       [id number] Cammie Willams MD  Electronically signed on 2024 at 7:38:37 AM       ** Final **    Last Labs:  CBC - 2024:  5:49 AM  6.2 12.9 183    38.4      CMP - 2024:  5:49 AM  8.8 5.9 11 --- 0.4   _ 3.7 11 52      PTT - No results in last year.  _   _ _     Inpatient Medications:  Scheduled medications   Medication Dose Route Frequency    apixaban  5 mg oral BID    dilTIAZem CD  120 mg oral Daily    dofetilide  500 mcg oral q12h    metoprolol succinate XL  50 mg oral BID    pantoprazole  40 mg oral Daily before breakfast    Or    pantoprazole  40 mg intravenous Daily before breakfast    perflutren lipid microspheres  0.5-10 mL of dilution intravenous Once in imaging    perflutren protein A microsphere  0.5 mL intravenous Once in imaging    polyethylene glycol  17 g oral Daily    sacubitriL-valsartan  0.5 tablet oral BID    sulfur hexafluoride microsphr  2 mL intravenous Once in imaging    tamsulosin  0.4 mg oral Daily     Principal Problem:    A-fib (CMS/HCC)  Active Problems:    Atrial fibrillation with rapid ventricular response (CMS/HCC)    Assessment/Plan   72-year-old male patient with underlying history of atrial fibrillation with a multiple ablation past, cardioversion past now with back in A-fib RVR patient cardioverted yesterday back in sinus rhythm.  Atrial fibrillation rapid ventricular rate: Patient status post cardioversion continue current rate control occasion including Cardizem, Toprol, dofetilide.  Stable cardiac wise.   Patient office follow-up with cardiac monitoring.  Modify risk factor.  Patient remains symptomatic.  Diet and exercise reviewed with patient..advice to walk about 10,000 steps or about 2 hours during day time. Cut back on salt, sugar and flour.    Code Status:  Full Code  I spent 35 minutes in the professional and overall care of this patient.  Zack Connelly MD

## 2024-04-09 NOTE — NURSING NOTE
Patient A & O x 3. RA. Denies c/o SOB, pain, discomfort or dizziness. Telemetry monitor intact and functioning. NSR. NAD noted. Independent. Needs met. Call light within patient's reach. Will continue to monitor.

## 2024-04-09 NOTE — DISCHARGE SUMMARY
Discharge Diagnosis  A-fib (CMS/[facility])    Issues Requiring Follow-Up  Cardiac; hypertension/blood pressure and heart rhythm     Discharge Meds     Your medication list        START taking these medications        Instructions Last Dose Given Next Dose Due   acetaminophen 325 mg tablet  Commonly known as: Tylenol      Take 2 tablets (650 mg) by mouth every 4 hours if needed for mild pain (1 - 3).       dilTIAZem  mg 24 hr capsule  Commonly known as: Tiazac  Start taking on: April 10, 2024      Take 1 capsule (120 mg) by mouth once daily. Do not start before April 10, 2024.       melatonin 3 mg tablet      Take 1 tablet (3 mg) by mouth as needed at bedtime for sleep.       metoprolol succinate XL 50 mg 24 hr tablet  Commonly known as: Toprol-XL      Take 1 tablet (50 mg) by mouth 2 times a day. Do not crush or chew.              CHANGE how you take these medications        Instructions Last Dose Given Next Dose Due   dofetilide 500 mcg capsule  Commonly known as: Tikosyn  What changed: See the new instructions.      Take 1 capsule (500 mcg) by mouth every 12 hours.              CONTINUE taking these medications        Instructions Last Dose Given Next Dose Due   Eliquis 5 mg tablet  Generic drug: apixaban      Take 1 tablet (5 mg) by mouth 2 times a day.       Entresto  mg tablet  Generic drug: sacubitriL-valsartan           tamsulosin 0.4 mg 24 hr capsule  Commonly known as: Flomax      TAKE 1 CAPSULE DAILY, ONE-HALF HOUR FOLLOWING THE SAME MEAL DAILY              STOP taking these medications      amLODIPine 10 mg tablet  Commonly known as: Norvasc        carvedilol 25 mg tablet  Commonly known as: Coreg                  Where to Get Your Medications        These medications were sent to [id number] - [address] [address]      Phone: [phone]   dilTIAZem  mg 24 hr capsule  metoprolol succinate XL 50 mg 24 hr tablet        Information about where to get these medications is not yet available    Ask your nurse or doctor about these medications  acetaminophen 325 mg tablet  dofetilide 500 mcg capsule  melatonin 3 mg tablet         Test Results Pending At Discharge  Pending Labs       No current pending labs.            Hospital Course   Patient is a 73 year old male who has a history of atrial fib with ablation 3 years ago that presented to the ED for evaluation of palpitations. Patient was found to be in afib rvr. Coreg was increased and observed overnight, he remained in atrial fib. Patient was cardioverted by Dr. Connelly to sinus rhythm and remains in SR at discharge. Patient will stop taking his norvasc and start taking cardizem and metoprolol, continue tikosyn eliquis and entresto. Follow up with Dr. Willams in 2 weeks.    Pertinent Physical Exam At Time of Discharge  Physical Exam  Constitutional:       Appearance: Normal appearance.   Cardiovascular:      Rate and Rhythm: Normal rate and regular rhythm.      Pulses: Normal pulses.      Heart sounds: Normal heart sounds.   Pulmonary:      Effort: Pulmonary effort is normal.      Breath sounds: Normal breath sounds.   Abdominal:      General: Bowel sounds are normal.      Palpations: Abdomen is soft.   Musculoskeletal:         General: Normal range of motion.   Skin:     General: Skin is warm and dry.   Neurological:      Mental Status: He is alert and oriented to person, place, and time. Mental status is at baseline.         Outpatient Follow-Up  Future Appointments   Date Time Provider Department Center   5/16/2024 10:45 AM Rayray Peterson MD [id number] None   7/23/2024 10:20 AM CLAUDIA Sneed-CNP [id number] [facility]   8/5/2024  9:20 AM Cmamie Willams MD [id number] [facility]         CLUADIA Ayers-CNP

## 2024-04-09 NOTE — CARE PLAN
Problem: Pain  Goal: My pain/discomfort is manageable  4/9/2024 1124 by Roxy Araujo RN  Outcome: Adequate for Discharge  4/9/2024 0916 by Roxy Araujo RN  Outcome: Progressing     Problem: Safety  Goal: Patient will be injury free during hospitalization  4/9/2024 1124 by Roxy Araujo RN  Outcome: Adequate for Discharge  4/9/2024 0916 by Roxy Araujo RN  Outcome: Progressing  Goal: I will remain free of falls  4/9/2024 1124 by Roxy Araujo RN  Outcome: Adequate for Discharge  4/9/2024 0916 by Roxy Araujo RN  Outcome: Progressing     Problem: Daily Care  Goal: Daily care needs are met  4/9/2024 1124 by Roxy Araujo RN  Outcome: Adequate for Discharge  4/9/2024 0916 by Roxy Araujo RN  Outcome: Progressing     Problem: Psychosocial Needs  Goal: Demonstrates ability to cope with hospitalization/illness  4/9/2024 1124 by Roxy Araujo RN  Outcome: Adequate for Discharge  4/9/2024 0916 by Roxy Araujo RN  Outcome: Progressing  Goal: Collaborate with me, my family, and caregiver to identify my specific goals  4/9/2024 1124 by Roxy Araujo RN  Outcome: Adequate for Discharge  4/9/2024 0916 by Roxy Araujo RN  Outcome: Progressing     Problem: Discharge Barriers  Goal: My discharge needs are met  4/9/2024 1124 by Roxy Araujo RN  Outcome: Adequate for Discharge  4/9/2024 0916 by Roxy Araujo RN  Outcome: Progressing     Problem: Pain - Adult  Goal: Verbalizes/displays adequate comfort level or baseline comfort level  4/9/2024 1124 by Roxy Araujo RN  Outcome: Adequate for Discharge  4/9/2024 0916 by Roxy Araujo RN  Outcome: Progressing     Problem: Safety - Adult  Goal: Free from fall injury  4/9/2024 1124 by Roxy Araujo RN  Outcome: Adequate for Discharge  4/9/2024 0916 by Roxy Araujo RN  Outcome: Progressing     Problem: Discharge Planning  Goal: Discharge to home or other facility with appropriate resources  4/9/2024 1124 by Roxy Araujo RN  Outcome: Adequate for  Discharge  4/9/2024 0916 by Roxy Araujo RN  Outcome: Progressing     Problem: Chronic Conditions and Co-morbidities  Goal: Patient's chronic conditions and co-morbidity symptoms are monitored and maintained or improved  4/9/2024 1124 by Roxy Araujo RN  Outcome: Adequate for Discharge  4/9/2024 0916 by Roxy Araujo RN  Outcome: Progressing   The patient's goals for the shift include      The clinical goals for the shift include remain in NSR, cardiac stability    Over the shift, the patient did not make progress toward the following goals. Barriers to progression include. Recommendations to address these barriers include.

## 2024-04-17 DIAGNOSIS — I50.32 CHRONIC DIASTOLIC CONGESTIVE HEART FAILURE (MULTI): Primary | ICD-10-CM

## 2024-04-19 ENCOUNTER — PATIENT OUTREACH (OUTPATIENT)
Dept: CARE COORDINATION | Facility: CLINIC | Age: 74
End: 2024-04-19
Payer: MEDICARE

## 2024-04-19 SDOH — ECONOMIC STABILITY: GENERAL: WOULD YOU LIKE HELP WITH ANY OF THE FOLLOWING NEEDS?: I DONT NEED HELP WITH ANY OF THESE

## 2024-04-19 SDOH — SOCIAL STABILITY: SOCIAL NETWORK: ARE YOU MARRIED, WIDOWED, DIVORCED, SEPARATED, NEVER MARRIED, OR LIVING WITH A PARTNER?: MARRIED

## 2024-04-19 NOTE — PROGRESS NOTES
Received discharge notification from [facility] report. Admit to [facility] 4/6 to 4/9 with atrial fib with RVR; status post successful cardioversion. . Amlodipine and Carvedilol stopped.  Home on Cardizem and Metoprolol; continue Tikosyn, and Eliquis, and Entresto.      Appt with cardiology 5/1 and 5/16.  Schedule PCP follow up.     Engagement  Call Start Time: 1017 (4/19/2024 10:17 AM)    Medications  Medications reviewed with patient/caregiver?: Yes (4/19/2024 10:17 AM)  Is the patient having any side effects they believe may be caused by any medication additions or changes?: No (4/19/2024 10:17 AM)  Does the patient have all medications ordered at discharge?: Yes (4/19/2024 10:17 AM)  Care Management Interventions: No intervention needed (4/19/2024 10:17 AM)    Appointments  Does the patient have a primary care provider?: Yes (4/19/2024 10:17 AM)  Care Management Interventions: Verified appointment date/time/provider (4/19/2024 10:17 AM)  Has the patient kept scheduled appointments due by today?: Yes (4/19/2024 10:17 AM)    Patient Teaching  Does the patient have access to their discharge instructions?: Yes (4/19/2024 10:17 AM)  Care Management Interventions: Reviewed instructions with patient (Patient states is logging home bp and will take to follow up appts.) (4/19/2024 10:17 AM)  What is the patient's perception of their health status since discharge?: Improving (4/19/2024 10:17 AM)  Is the patient/caregiver able to teach back the hierarchy of who to call/visit for symptoms/problems? PCP, Specialist, Home Health nurse, Urgent Care, ED, 911: Yes (4/19/2024 10:17 AM)    Wrap Up  Call End Time: 1018 (4/19/2024 10:17 AM)      Successful transition of care outreach to patient.  Enrolled in [employer] chat to monitor for 30 day transition period and will outreach if issues arise.     Zaida DOMÍNGUEZ RN CCM  RN Care Coordinator  [facility]  Phone [phone]

## 2024-05-01 ENCOUNTER — OFFICE VISIT (OUTPATIENT)
Dept: CARDIOLOGY | Facility: CLINIC | Age: 74
End: 2024-05-01
Payer: MEDICARE

## 2024-05-01 VITALS
WEIGHT: 228 LBS | OXYGEN SATURATION: 98 % | TEMPERATURE: 98.6 F | HEIGHT: 75 IN | RESPIRATION RATE: 18 BRPM | BODY MASS INDEX: 28.35 KG/M2 | SYSTOLIC BLOOD PRESSURE: 146 MMHG | HEART RATE: 72 BPM | DIASTOLIC BLOOD PRESSURE: 88 MMHG

## 2024-05-01 DIAGNOSIS — R00.2 PALPITATIONS: Primary | ICD-10-CM

## 2024-05-01 DIAGNOSIS — I10 PRIMARY HYPERTENSION: ICD-10-CM

## 2024-05-01 DIAGNOSIS — I48.91 ATRIAL FIBRILLATION WITH RAPID VENTRICULAR RESPONSE (MULTI): ICD-10-CM

## 2024-05-01 DIAGNOSIS — I48.11 LONGSTANDING PERSISTENT ATRIAL FIBRILLATION (MULTI): ICD-10-CM

## 2024-05-01 DIAGNOSIS — I50.32 CHRONIC DIASTOLIC CONGESTIVE HEART FAILURE (MULTI): ICD-10-CM

## 2024-05-01 DIAGNOSIS — I20.89 OTHER FORMS OF ANGINA PECTORIS (CMS-HCC): ICD-10-CM

## 2024-05-01 DIAGNOSIS — I48.0 PAROXYSMAL ATRIAL FIBRILLATION (MULTI): ICD-10-CM

## 2024-05-01 PROCEDURE — 3077F SYST BP >= 140 MM HG: CPT | Performed by: INTERNAL MEDICINE

## 2024-05-01 PROCEDURE — 1126F AMNT PAIN NOTED NONE PRSNT: CPT | Performed by: INTERNAL MEDICINE

## 2024-05-01 PROCEDURE — 1159F MED LIST DOCD IN RCRD: CPT | Performed by: INTERNAL MEDICINE

## 2024-05-01 PROCEDURE — 99214 OFFICE O/P EST MOD 30 MIN: CPT | Performed by: INTERNAL MEDICINE

## 2024-05-01 PROCEDURE — 1111F DSCHRG MED/CURRENT MED MERGE: CPT | Performed by: INTERNAL MEDICINE

## 2024-05-01 PROCEDURE — 1160F RVW MEDS BY RX/DR IN RCRD: CPT | Performed by: INTERNAL MEDICINE

## 2024-05-01 PROCEDURE — 3079F DIAST BP 80-89 MM HG: CPT | Performed by: INTERNAL MEDICINE

## 2024-05-01 PROCEDURE — 1123F ACP DISCUSS/DSCN MKR DOCD: CPT | Performed by: INTERNAL MEDICINE

## 2024-05-01 PROCEDURE — 1036F TOBACCO NON-USER: CPT | Performed by: INTERNAL MEDICINE

## 2024-05-01 PROCEDURE — 93000 ELECTROCARDIOGRAM COMPLETE: CPT | Performed by: INTERNAL MEDICINE

## 2024-05-01 RX ORDER — CARVEDILOL 25 MG/1
25 TABLET ORAL
Qty: 180 TABLET | Refills: 3 | Status: SHIPPED | OUTPATIENT
Start: 2024-05-01 | End: 2025-05-01

## 2024-05-01 RX ORDER — DILTIAZEM HYDROCHLORIDE 120 MG/1
120 CAPSULE, EXTENDED RELEASE ORAL DAILY
Qty: 90 CAPSULE | Refills: 3 | Status: CANCELLED | OUTPATIENT
Start: 2024-05-01 | End: 2025-04-26

## 2024-05-01 RX ORDER — AMLODIPINE BESYLATE 10 MG/1
10 TABLET ORAL DAILY
Qty: 90 TABLET | Refills: 3 | Status: SHIPPED | OUTPATIENT
Start: 2024-05-01 | End: 2025-05-01

## 2024-05-01 RX ORDER — METOPROLOL SUCCINATE 50 MG/1
50 TABLET, EXTENDED RELEASE ORAL 2 TIMES DAILY
Qty: 180 TABLET | Refills: 3 | Status: CANCELLED | OUTPATIENT
Start: 2024-05-01 | End: 2025-04-26

## 2024-05-01 ASSESSMENT — LIFESTYLE VARIABLES
AUDIT TOTAL SCORE: 2
HOW OFTEN DURING THE LAST YEAR HAVE YOU NEEDED AN ALCOHOLIC DRINK FIRST THING IN THE MORNING TO GET YOURSELF GOING AFTER A NIGHT OF HEAVY DRINKING: NEVER
HOW OFTEN DURING THE LAST YEAR HAVE YOU FAILED TO DO WHAT WAS NORMALLY EXPECTED FROM YOU BECAUSE OF DRINKING: NEVER
SKIP TO QUESTIONS 9-10: 1
HAS A RELATIVE, FRIEND, DOCTOR, OR ANOTHER HEALTH PROFESSIONAL EXPRESSED CONCERN ABOUT YOUR DRINKING OR SUGGESTED YOU CUT DOWN: NO
HOW MANY STANDARD DRINKS CONTAINING ALCOHOL DO YOU HAVE ON A TYPICAL DAY: 1 OR 2
HOW OFTEN DO YOU HAVE SIX OR MORE DRINKS ON ONE OCCASION: NEVER
HOW OFTEN DO YOU HAVE A DRINK CONTAINING ALCOHOL: 2-4 TIMES A MONTH
HOW OFTEN DURING THE LAST YEAR HAVE YOU FOUND THAT YOU WERE NOT ABLE TO STOP DRINKING ONCE YOU HAD STARTED: NEVER
HAVE YOU OR SOMEONE ELSE BEEN INJURED AS A RESULT OF YOUR DRINKING: NO
HOW OFTEN DURING THE LAST YEAR HAVE YOU HAD A FEELING OF GUILT OR REMORSE AFTER DRINKING: NEVER
HOW OFTEN DURING THE LAST YEAR HAVE YOU BEEN UNABLE TO REMEMBER WHAT HAPPENED THE NIGHT BEFORE BECAUSE YOU HAD BEEN DRINKING: NEVER
AUDIT-C TOTAL SCORE: 2

## 2024-05-01 ASSESSMENT — PATIENT HEALTH QUESTIONNAIRE - PHQ9
SUM OF ALL RESPONSES TO PHQ9 QUESTIONS 1 AND 2: 0
1. LITTLE INTEREST OR PLEASURE IN DOING THINGS: NOT AT ALL
2. FEELING DOWN, DEPRESSED OR HOPELESS: NOT AT ALL

## 2024-05-01 ASSESSMENT — ENCOUNTER SYMPTOMS
OCCASIONAL FEELINGS OF UNSTEADINESS: 0
DEPRESSION: 0
LOSS OF SENSATION IN FEET: 0

## 2024-05-01 ASSESSMENT — PAIN SCALES - GENERAL: PAINLEVEL: 0-NO PAIN

## 2024-05-01 NOTE — PROGRESS NOTES
History of present illness:  Patient with history of heart failure diastolic dysfunction, paroxysmal atrial fibrillation on oral anticoagulation status post ablation about 3 years ago.  Returns to my office for follow-up after recent hospitalization for atrial fibrillation status post cardioversion.  Feeling good.  Remains sinus rhythm.  Denies any chest pain shortness of breath palpitations or dizziness.  He will see Dr. Peterson in 2 weeks from now.     Past Medical History:   Diagnosis Date    Atrial fibrillation (Multi) 06/21/2023    Hx of ablation    BPH (benign prostatic hyperplasia)     Cardiomyopathy (Multi) 01/30/2024    Cammie Willams MD  Physician : Cardiology    Chronic anticoagulation     Eliquis    Chronic diastolic congestive heart failure (Multi) 06/21/2023    Hypertension 06/21/2023    Other forms of angina pectoris (CMS-HCC) 06/21/2023    Palpitations 01/30/2024    Personal history of other diseases of the circulatory system 12/08/2022    History of atrial fibrillation    Personal history of other endocrine, nutritional and metabolic disease 11/17/2019    History of hyperglycemia    Screening for colorectal cancer 03/21/2024    Syncope 01/30/2024       Past Surgical History:   Procedure Laterality Date    CARDIOVERSION  04/08/2024    COLONOSCOPY  05/29/2013    Complete Colonoscopy    OTHER SURGICAL HISTORY  05/29/2013    Stress Test ECG Performed       No Known Allergies     reports that he has never smoked. He has never been exposed to tobacco smoke. He has never used smokeless tobacco. He reports current alcohol use. He reports that he does not use drugs.    Family History   Problem Relation Name Age of Onset    Lymphoma Mother      Sudden death Father  44        heart attack       Patient's Medications   New Prescriptions    No medications on file   Previous Medications    ACETAMINOPHEN (TYLENOL) 325 MG TABLET    Take 2 tablets (650 mg) by mouth every 4 hours if needed for mild pain (1 - 3).     DILTIAZEM CD (TIAZAC) 120 MG 24 HR CAPSULE    Take 1 capsule (120 mg) by mouth once daily. Do not start before April 10, 2024.    DOFETILIDE (TIKOSYN) 500 MCG CAPSULE    Take 1 capsule (500 mcg) by mouth every 12 hours.    ELIQUIS 5 MG TABLET    Take 1 tablet (5 mg) by mouth 2 times a day.    ENTRESTO  MG TABLET    Take 1 tablet by mouth 2 times a day.    MELATONIN 3 MG TABLET    Take 1 tablet (3 mg) by mouth as needed at bedtime for sleep.    METOPROLOL SUCCINATE XL (TOPROL-XL) 50 MG 24 HR TABLET    Take 1 tablet (50 mg) by mouth 2 times a day. Do not crush or chew.    TAMSULOSIN (FLOMAX) 0.4 MG 24 HR CAPSULE    TAKE 1 CAPSULE DAILY, ONE-HALF HOUR FOLLOWING THE SAME MEAL DAILY   Modified Medications    No medications on file   Discontinued Medications    No medications on file       Objective   Physical Exam  General: Patient in no acute distress   HEENT: Atraumatic normocephalic.  Neck: Supple, jugular venous pressure within normal limit.  No bruits  Lungs: Clear to auscultation bilaterally  Cardiovascular: Regular rate and rhythm, normal heart sounds, no murmurs rubs or gallops  Abdomen: Soft nontender nondistended.  Normal bowel sounds.  Extremities: Warm to touch, no edema.        Lab Review   Admission on 04/06/2024, Discharged on 04/09/2024   Component Date Value    WBC 04/06/2024 7.0     nRBC 04/06/2024 0.0     RBC 04/06/2024 4.82     Hemoglobin 04/06/2024 14.1     Hematocrit 04/06/2024 42.0     MCV 04/06/2024 87     MCH 04/06/2024 29.3     MCHC 04/06/2024 33.6     RDW 04/06/2024 13.0     Platelets 04/06/2024 233     Neutrophils % 04/06/2024 61.6     Immature Granulocytes %,* 04/06/2024 0.1     Lymphocytes % 04/06/2024 22.7     Monocytes % 04/06/2024 9.8     Eosinophils % 04/06/2024 4.9     Basophils % 04/06/2024 0.9     Neutrophils Absolute 04/06/2024 4.29     Immature Granulocytes Ab* 04/06/2024 0.01     Lymphocytes Absolute 04/06/2024 1.58     Monocytes Absolute 04/06/2024 0.68     Eosinophils  Absolute 04/06/2024 0.34     Basophils Absolute 04/06/2024 0.06     Glucose 04/06/2024 123 (H)     Sodium 04/06/2024 136     Potassium 04/06/2024 3.9     Chloride 04/06/2024 101     Bicarbonate 04/06/2024 25     Urea Nitrogen 04/06/2024 14     Creatinine 04/06/2024 0.90     eGFR 04/06/2024 >90     Calcium 04/06/2024 9.4     Albumin 04/06/2024 4.3     Alkaline Phosphatase 04/06/2024 61     Total Protein 04/06/2024 7.1     AST 04/06/2024 13     Bilirubin, Total 04/06/2024 0.3     ALT 04/06/2024 14     Anion Gap 04/06/2024 10     Magnesium 04/06/2024 2.10     PROBNP 04/06/2024 711 (H)     Troponin T, High Sensiti* 04/06/2024 14     Troponin T, High Sensiti* 04/06/2024 8     Magnesium 04/06/2024 2.00     Troponin T, High Sensiti* 04/06/2024 9     WBC 04/07/2024 6.2     nRBC 04/07/2024 0.0     RBC 04/07/2024 4.41 (L)     Hemoglobin 04/07/2024 12.9 (L)     Hematocrit 04/07/2024 38.4 (L)     MCV 04/07/2024 87     MCH 04/07/2024 29.3     MCHC 04/07/2024 33.6     RDW 04/07/2024 13.1     Platelets 04/07/2024 183     Glucose 04/07/2024 107 (H)     Sodium 04/07/2024 140     Potassium 04/07/2024 4.0     Chloride 04/07/2024 107     Bicarbonate 04/07/2024 24     Urea Nitrogen 04/07/2024 13     Creatinine 04/07/2024 0.90     eGFR 04/07/2024 >90     Calcium 04/07/2024 8.8     Albumin 04/07/2024 3.7     Alkaline Phosphatase 04/07/2024 52     Total Protein 04/07/2024 5.9     AST 04/07/2024 11     Bilirubin, Total 04/07/2024 0.4     ALT 04/07/2024 11     Anion Gap 04/07/2024 9     AV pk roro 04/08/2024 0.79     LVOT diam 04/08/2024 2.50     AV mn grad 04/08/2024 1.0     Tricuspid annular plane * 04/08/2024 1.7     LV Biplane EF 04/08/2024 56     LA vol index A/L 04/08/2024 19.5     MV avg E/e' ratio 04/08/2024 4.71     RV free wall pk S' 04/08/2024 15.80     LVIDd 04/08/2024 5.23     AV pk grad 04/08/2024 2.5     Aortic Valve Area by Con* 04/08/2024 6.48     Aortic Valve Area by Con* 04/08/2024 4.82     LV A4C EF 04/08/2024 55.4     Hospital Outpatient Visit on 03/21/2024   Component Date Value    Case Report 03/21/2024                      Value:Surgical Pathology                                Case: [id number]                                  Authorizing Provider:  Nick Orozco MD    Collected:           03/21/2024 1519              Ordering Location:     [facility]   Received:            03/21/2024 1600                                     OR                                                                           Pathologist:           Charisse Salamanca MD                                                         Specimen:    COLON - SIGMOID POLYP                                                                      FINAL DIAGNOSIS 03/21/2024                      Value:This result contains rich text formatting which cannot be displayed here.      03/21/2024                      Value:This result contains rich text formatting which cannot be displayed here.    Gross Description 03/21/2024                      Value:This result contains rich text formatting which cannot be displayed here.        Assessment/Plan   Patient Active Problem List   Diagnosis    Atrial fibrillation (Multi)    Chronic diastolic congestive heart failure (Multi)    Enlarged prostate without lower urinary tract symptoms (luts)    Hypertension    Prostatitis syndrome    Renal cyst, acquired, left    Other forms of angina pectoris (CMS-HCC)    Cardiomyopathy (Multi)    Excessive cerumen in ear canal    Hand pain    Hyperglycemia    Obesity with body mass index 30 or greater    Palpitations    Syncope    A-fib (Multi)    Atrial fibrillation with rapid ventricular response (Multi)      Patient with history of heart failure diastolic dysfunction, paroxysmal atrial fibrillation on oral anticoagulation status post ablation about 3 years ago.  Returns to my office for follow-up after recent hospitalization for atrial fibrillation status post cardioversion.   Feeling good.  Remains sinus rhythm.  Denies any chest pain shortness of breath palpitations or dizziness.  He will see Dr. Peterson in 2 weeks from now.  He stable from my standpoint.  I will change back again his medications to carvedilol 25 mg oral twice daily and addition of amlodipine for better blood pressure and rate control stop diltiazem and stop metoprolol.  Will follow-up in 6 months.  Discussed with patient lifestyle modification.      Cammie Willams MD

## 2024-05-03 ENCOUNTER — PATIENT OUTREACH (OUTPATIENT)
Dept: CARE COORDINATION | Facility: CLINIC | Age: 74
End: 2024-05-03
Payer: MEDICARE

## 2024-05-03 NOTE — PROGRESS NOTES
Guy   Reviewed post hospital cardiology note- added Amlodipine and changed Carvedilol to 25 mg twice daily; stopped Metoprolol and Diltiazem.    Appt with cardiology EPS in 2 wks. PCP appt in November.   Patient is able to correctly verbalize above med changes and states he monitors his blood pressure periodically.  Will continue to monitor for 30 day transition period and outreach if issues arise.    Zaida DOMÍNGUEZ RN CCM  RN Care Coordinator  [facility]  Phone [phone]

## 2024-05-16 ENCOUNTER — OFFICE VISIT (OUTPATIENT)
Dept: CARDIOLOGY | Facility: CLINIC | Age: 74
End: 2024-05-16
Payer: MEDICARE

## 2024-05-16 VITALS — SYSTOLIC BLOOD PRESSURE: 116 MMHG | DIASTOLIC BLOOD PRESSURE: 76 MMHG | HEART RATE: 65 BPM

## 2024-05-16 DIAGNOSIS — I48.0 PAROXYSMAL ATRIAL FIBRILLATION (MULTI): ICD-10-CM

## 2024-05-16 PROCEDURE — 1160F RVW MEDS BY RX/DR IN RCRD: CPT | Performed by: INTERNAL MEDICINE

## 2024-05-16 PROCEDURE — 93005 ELECTROCARDIOGRAM TRACING: CPT | Performed by: INTERNAL MEDICINE

## 2024-05-16 PROCEDURE — 1159F MED LIST DOCD IN RCRD: CPT | Performed by: INTERNAL MEDICINE

## 2024-05-16 PROCEDURE — 99214 OFFICE O/P EST MOD 30 MIN: CPT | Performed by: INTERNAL MEDICINE

## 2024-05-16 PROCEDURE — 1123F ACP DISCUSS/DSCN MKR DOCD: CPT | Performed by: INTERNAL MEDICINE

## 2024-05-16 PROCEDURE — 93010 ELECTROCARDIOGRAM REPORT: CPT | Performed by: INTERNAL MEDICINE

## 2024-05-16 PROCEDURE — 3078F DIAST BP <80 MM HG: CPT | Performed by: INTERNAL MEDICINE

## 2024-05-16 PROCEDURE — 3074F SYST BP LT 130 MM HG: CPT | Performed by: INTERNAL MEDICINE

## 2024-05-16 NOTE — ASSESSMENT & PLAN NOTE
The patient is doing well as above.  Continue dofetilide and anticoagulation.  He does have blood work and as such we can defer further blood work for his next visit

## 2024-05-16 NOTE — PROGRESS NOTES
No chief complaint on file.           The patient is well-known to me.  He is a 74-year-old male with past medical history significant for hypertension and paroxysmal atrial fibrillation post catheter-based therapy in 2021.  He is maintained on dofetilide and is doing quite well.  He did have 1 recurrence and prompted him to visit the emergency room at which point they cardioverted him about a month ago.  And he has been in sinus rhythm since.  He is compliant with his medications including his anticoagulation    Atrial Fibrillation  Past medical history includes atrial fibrillation.        Active Ambulatory Problems     Diagnosis Date Noted   • Atrial fibrillation (Multi) 06/21/2023   • Chronic diastolic congestive heart failure (Multi) 06/21/2023   • Enlarged prostate without lower urinary tract symptoms (luts) 06/21/2023   • Hypertension 06/21/2023   • Prostatitis syndrome 06/21/2023   • Renal cyst, acquired, left 06/21/2023   • Other forms of angina pectoris (CMS-HCC) 06/21/2023   • Cardiomyopathy (Multi) 01/30/2024   • Excessive cerumen in ear canal 01/30/2024   • Hand pain 01/30/2024   • Hyperglycemia 01/30/2024   • Obesity with body mass index 30 or greater 01/30/2024   • Palpitations 01/30/2024   • Syncope 01/30/2024   • A-fib (Multi) 04/06/2024   • Atrial fibrillation with rapid ventricular response (Multi) 04/07/2024     Resolved Ambulatory Problems     Diagnosis Date Noted   • No Resolved Ambulatory Problems     Past Medical History:   Diagnosis Date   • BPH (benign prostatic hyperplasia)    • Chronic anticoagulation    • Personal history of other diseases of the circulatory system 12/08/2022   • Personal history of other endocrine, nutritional and metabolic disease 11/17/2019   • Screening for colorectal cancer 03/21/2024        Review of Systems   All other systems reviewed and are negative.       Objective     There were no vitals filed for this visit.     Vitals and nursing note reviewed.    Constitutional:       Appearance: Healthy appearance. Overweight.   HENT:    Mouth/Throat:      Pharynx: Oropharynx is clear.   Pulmonary:      Effort: Pulmonary effort is normal.      Breath sounds: Normal breath sounds.   Cardiovascular:      PMI at left midclavicular line. Normal rate. Regular rhythm. Normal S1. Normal S2.       Murmurs: There is a grade 1/6 mid frequency blowing holosystolic murmur.      No gallop.  No click. No rub.   Pulses:     Intact distal pulses.   Edema:     Peripheral edema absent.   Abdominal:      General: Bowel sounds are normal.   Musculoskeletal:      Cervical back: Normal range of motion. Skin:     General: Skin is warm and dry.   Neurological:      General: No focal deficit present.      Mental Status: Alert and oriented to person, place and time.          Lab Review:   Admission on 04/06/2024, Discharged on 04/09/2024   Component Date Value   • WBC 04/06/2024 7.0    • nRBC 04/06/2024 0.0    • RBC 04/06/2024 4.82    • Hemoglobin 04/06/2024 14.1    • Hematocrit 04/06/2024 42.0    • MCV 04/06/2024 87    • MCH 04/06/2024 29.3    • MCHC 04/06/2024 33.6    • RDW 04/06/2024 13.0    • Platelets 04/06/2024 233    • Neutrophils % 04/06/2024 61.6    • Immature Granulocytes %,* 04/06/2024 0.1    • Lymphocytes % 04/06/2024 22.7    • Monocytes % 04/06/2024 9.8    • Eosinophils % 04/06/2024 4.9    • Basophils % 04/06/2024 0.9    • Neutrophils Absolute 04/06/2024 4.29    • Immature Granulocytes Ab* 04/06/2024 0.01    • Lymphocytes Absolute 04/06/2024 1.58    • Monocytes Absolute 04/06/2024 0.68    • Eosinophils Absolute 04/06/2024 0.34    • Basophils Absolute 04/06/2024 0.06    • Glucose 04/06/2024 123 (H)    • Sodium 04/06/2024 136    • Potassium 04/06/2024 3.9    • Chloride 04/06/2024 101    • Bicarbonate 04/06/2024 25    • Urea Nitrogen 04/06/2024 14    • Creatinine 04/06/2024 0.90    • eGFR 04/06/2024 >90    • Calcium 04/06/2024 9.4    • Albumin 04/06/2024 4.3    • Alkaline Phosphatase  04/06/2024 61    • Total Protein 04/06/2024 7.1    • AST 04/06/2024 13    • Bilirubin, Total 04/06/2024 0.3    • ALT 04/06/2024 14    • Anion Gap 04/06/2024 10    • Magnesium 04/06/2024 2.10    • PROBNP 04/06/2024 711 (H)    • Troponin T, High Sensiti* 04/06/2024 14    • Troponin T, High Sensiti* 04/06/2024 8    • Magnesium 04/06/2024 2.00    • Troponin T, High Sensiti* 04/06/2024 9    • WBC 04/07/2024 6.2    • nRBC 04/07/2024 0.0    • RBC 04/07/2024 4.41 (L)    • Hemoglobin 04/07/2024 12.9 (L)    • Hematocrit 04/07/2024 38.4 (L)    • MCV 04/07/2024 87    • MCH 04/07/2024 29.3    • MCHC 04/07/2024 33.6    • RDW 04/07/2024 13.1    • Platelets 04/07/2024 183    • Glucose 04/07/2024 107 (H)    • Sodium 04/07/2024 140    • Potassium 04/07/2024 4.0    • Chloride 04/07/2024 107    • Bicarbonate 04/07/2024 24    • Urea Nitrogen 04/07/2024 13    • Creatinine 04/07/2024 0.90    • eGFR 04/07/2024 >90    • Calcium 04/07/2024 8.8    • Albumin 04/07/2024 3.7    • Alkaline Phosphatase 04/07/2024 52    • Total Protein 04/07/2024 5.9    • AST 04/07/2024 11    • Bilirubin, Total 04/07/2024 0.4    • ALT 04/07/2024 11    • Anion Gap 04/07/2024 9    • AV pk roro 04/08/2024 0.79    • LVOT diam 04/08/2024 2.50    • AV mn grad 04/08/2024 1.0    • Tricuspid annular plane * 04/08/2024 1.7    • LV Biplane EF 04/08/2024 56    • LA vol index A/L 04/08/2024 19.5    • MV avg E/e' ratio 04/08/2024 4.71    • RV free wall pk S' 04/08/2024 15.80    • LVIDd 04/08/2024 5.23    • AV pk grad 04/08/2024 2.5    • Aortic Valve Area by Con* 04/08/2024 6.48    • Aortic Valve Area by Con* 04/08/2024 4.82    • LV A4C EF 04/08/2024 55.4    Hospital Outpatient Visit on 03/21/2024   Component Date Value   • Case Report 03/21/2024                      Value:Surgical Pathology                                Case: [id number]                                  Authorizing Provider:  Nick Orozco MD    Collected:           03/21/2024 1519              Ordering  Location:     [facility]   Received:            03/21/2024 1600                                     OR                                                                           Pathologist:           Charisse Salamanca MD                                                         Specimen:    COLON - SIGMOID POLYP                                                                     • FINAL DIAGNOSIS 03/21/2024                      Value:This result contains rich text formatting which cannot be displayed here.   •   03/21/2024                      Value:This result contains rich text formatting which cannot be displayed here.   • Gross Description 03/21/2024                      Value:This result contains rich text formatting which cannot be displayed here.       ECG:  Sinus rhythm with first AV block at 65 bpm AR interval 210 ms QRS duration 90 ms QTc 440 ms    Assessment/plan:  The patient is doing well as above.  Continue dofetilide and anticoagulation.  He does have blood work and as such we can defer further blood work for his next visit    Problem List Items Addressed This Visit    None

## 2024-06-26 LAB
ATRIAL RATE: 131 BPM
Q ONSET: 212 MS
QRS COUNT: 19 BEATS
QRS DURATION: 90 MS
QT INTERVAL: 362 MS
QTC CALCULATION(BAZETT): 509 MS
QTC FREDERICIA: 454 MS
R AXIS: -9 DEGREES
T AXIS: -52 DEGREES
T OFFSET: 393 MS
VENTRICULAR RATE: 119 BPM

## 2024-07-22 NOTE — PROGRESS NOTES
"Subjective   Chief Complaint   Patient presents with    Hypertension     Alcides Tovar is a 74 y.o. male with hypertension and Afib. Patient is currently taking Eliquis BID, Dofetilide BID, Entresto BID, Diltiazem, Metoprolol 50 mg BID (Amlodipine and Coreg d/c'd in Apr 2024 per cards) had ablation in Aug 2021 and again in April 2024 per Dr. Connelly.        Patient ID: Alcides Tovar is a 74 y.o. male who presents for Hypertension (Alcides Tovar is a 74 y.o. male with hypertension and Afib. Patient is currently taking Eliquis BID, Dofetilide BID, Entresto BID, Diltiazem, Metoprolol 50 mg BID (Amlodipine and Coreg d/c'd in Apr 2024 per cards) had ablation in Aug 2021 and again in April 2024 per Dr. Connelly. ).    HPI  Alcides is an est 75 yo male presenting today for 6 month f/u on HTN   LOV: JAN 2024    Pt was hospitalized in April 2024 for Afib RVR  Cardioverted to SR   Med changes noted and reviewed with pt during OV    Dx: HTN/Afib, BPH     #HTN/AFIB  Afib dx'd 2021  Followed by cardiology, Dr. Willams  Electrophys: Dr. Peterson (ablation)  Rx: Coreg 25 mg BID, Amlodipine 10 mg daily, Eliquis BID, Entresto BID, Dofetilide BID  had ablation in Aug 2021 and again in April 2024 per Dr. Connelly   BP today= 128/76  LAST ECHO: 2022     #BPH  Dx'd 10 yrs ago   Taking Flomax 0.4 mg daily  sx: controlled      #HM  FBW: UTD   PSA: 2022  COLON: UTD    No Known Allergies    Review of Systems  ROS was completed and all systems are negative with the exception of what was noted in the the HPI.     Objective   /76 (BP Location: Right arm, Patient Position: Sitting)   Pulse 63   Ht 1.905 m (6' 3\")   Wt 108 kg (238 lb 9.6 oz)   SpO2 95%   BMI 29.82 kg/m²      Current Outpatient Medications   Medication Instructions    amLODIPine (NORVASC) 10 mg, oral, Daily    carvedilol (COREG) 25 mg, oral, 2 times daily (morning and late afternoon)    dofetilide (TIKOSYN) 500 mcg, oral, Every 12 hours    Eliquis 5 mg, oral, 2 times daily    " Entresto  mg tablet 1 tablet, oral, 2 times daily    tamsulosin (Flomax) 0.4 mg 24 hr capsule TAKE 1 CAPSULE DAILY, ONE-HALF HOUR FOLLOWING THE SAME MEAL DAILY         Physical Exam  Vitals reviewed.   Cardiovascular:      Pulses: Normal pulses.      Heart sounds: Normal heart sounds.   Pulmonary:      Effort: Pulmonary effort is normal.      Breath sounds: Normal breath sounds.   Neurological:      Mental Status: He is alert.         Assessment & Plan  Primary hypertension  Followed by cardiology, Dr. Willams  Electrophys: Dr. Peterson (ablation)  Rx; Eliquis BID, Dofetilide BID, Entresto BID, Diltiazem, Metoprolol 50 mg BID (Amlodipine and Coreg d/c'd in Apr 2024 per cards)  had ablation in Aug 2021 and again in April 2024 per Dr. Connelly   BP today= 128/76  LAST ECHO: 2024         History of atrial fibrillation  Dx'd 2021 with reoccurrence in April 2024  Followed by Cardiology        Paroxysmal atrial fibrillation (Multi)    Orders:    Eliquis 5 mg tablet; Take 1 tablet (5 mg) by mouth 2 times a day.

## 2024-07-23 ENCOUNTER — APPOINTMENT (OUTPATIENT)
Dept: PRIMARY CARE | Facility: CLINIC | Age: 74
End: 2024-07-23
Payer: MEDICARE

## 2024-07-23 VITALS
SYSTOLIC BLOOD PRESSURE: 128 MMHG | DIASTOLIC BLOOD PRESSURE: 76 MMHG | WEIGHT: 238.6 LBS | HEART RATE: 63 BPM | HEIGHT: 75 IN | OXYGEN SATURATION: 95 % | BODY MASS INDEX: 29.67 KG/M2

## 2024-07-23 DIAGNOSIS — I48.0 PAROXYSMAL ATRIAL FIBRILLATION (MULTI): ICD-10-CM

## 2024-07-23 DIAGNOSIS — I10 PRIMARY HYPERTENSION: Primary | ICD-10-CM

## 2024-07-23 DIAGNOSIS — Z86.79 HISTORY OF ATRIAL FIBRILLATION: ICD-10-CM

## 2024-07-23 PROBLEM — I48.91 ATRIAL FIBRILLATION (MULTI): Status: RESOLVED | Noted: 2023-06-21 | Resolved: 2024-07-23

## 2024-07-23 PROBLEM — R00.2 PALPITATIONS: Status: RESOLVED | Noted: 2024-01-30 | Resolved: 2024-07-23

## 2024-07-23 PROBLEM — R55 SYNCOPE: Status: RESOLVED | Noted: 2024-01-30 | Resolved: 2024-07-23

## 2024-07-23 PROBLEM — E66.9 OBESITY WITH BODY MASS INDEX 30 OR GREATER: Status: RESOLVED | Noted: 2024-01-30 | Resolved: 2024-07-23

## 2024-07-23 PROBLEM — R73.9 HYPERGLYCEMIA: Status: RESOLVED | Noted: 2024-01-30 | Resolved: 2024-07-23

## 2024-07-23 PROBLEM — I48.91 ATRIAL FIBRILLATION WITH RAPID VENTRICULAR RESPONSE (MULTI): Status: RESOLVED | Noted: 2024-04-07 | Resolved: 2024-07-23

## 2024-07-23 PROBLEM — I48.91 A-FIB (MULTI): Status: RESOLVED | Noted: 2024-04-06 | Resolved: 2024-07-23

## 2024-07-23 PROBLEM — M79.643 HAND PAIN: Status: RESOLVED | Noted: 2024-01-30 | Resolved: 2024-07-23

## 2024-07-23 PROCEDURE — 3078F DIAST BP <80 MM HG: CPT | Performed by: NURSE PRACTITIONER

## 2024-07-23 PROCEDURE — 1036F TOBACCO NON-USER: CPT | Performed by: NURSE PRACTITIONER

## 2024-07-23 PROCEDURE — 99213 OFFICE O/P EST LOW 20 MIN: CPT | Performed by: NURSE PRACTITIONER

## 2024-07-23 PROCEDURE — 3074F SYST BP LT 130 MM HG: CPT | Performed by: NURSE PRACTITIONER

## 2024-07-23 PROCEDURE — 1159F MED LIST DOCD IN RCRD: CPT | Performed by: NURSE PRACTITIONER

## 2024-07-23 PROCEDURE — 1160F RVW MEDS BY RX/DR IN RCRD: CPT | Performed by: NURSE PRACTITIONER

## 2024-07-23 PROCEDURE — 3008F BODY MASS INDEX DOCD: CPT | Performed by: NURSE PRACTITIONER

## 2024-07-23 PROCEDURE — 1123F ACP DISCUSS/DSCN MKR DOCD: CPT | Performed by: NURSE PRACTITIONER

## 2024-07-23 PROCEDURE — 1158F ADVNC CARE PLAN TLK DOCD: CPT | Performed by: NURSE PRACTITIONER

## 2024-07-23 RX ORDER — APIXABAN 5 MG/1
5 TABLET, FILM COATED ORAL 2 TIMES DAILY
Qty: 180 TABLET | Refills: 1 | Status: SHIPPED | OUTPATIENT
Start: 2024-07-23 | End: 2025-01-19

## 2024-07-23 NOTE — ASSESSMENT & PLAN NOTE
Followed by cardiology, Dr. Willams  Electrophys: Dr. Peterson (ablation)  Rx; Eliquis BID, Dofetilide BID, Entresto BID, Diltiazem, Metoprolol 50 mg BID (Amlodipine and Coreg d/c'd in Apr 2024 per cards)  had ablation in Aug 2021 and again in April 2024 per Dr. Connelly   BP today= 128/76  LAST ECHO: 2024

## 2024-08-05 ENCOUNTER — APPOINTMENT (OUTPATIENT)
Dept: CARDIOLOGY | Facility: CLINIC | Age: 74
End: 2024-08-05
Payer: MEDICARE

## 2024-08-07 DIAGNOSIS — I50.32 CHRONIC DIASTOLIC CONGESTIVE HEART FAILURE (MULTI): ICD-10-CM

## 2024-08-08 NOTE — TELEPHONE ENCOUNTER
Alcides Tovar  has called in to request a refill on his:    Entresto    Medication sent to pharmacy and Alcides Tovar will be notified of when available for / has been sent out for delivery     Requested Prescriptions     Pending Prescriptions Disp Refills    Entresto  mg tablet 180 tablet 3     Sig: Take 1 tablet by mouth 2 times a day.

## 2024-08-09 RX ORDER — SACUBITRIL AND VALSARTAN 97; 103 MG/1; MG/1
1 TABLET, FILM COATED ORAL 2 TIMES DAILY
Qty: 180 TABLET | Refills: 3 | Status: SHIPPED | OUTPATIENT
Start: 2024-08-09 | End: 2025-08-04

## 2024-09-17 ENCOUNTER — OFFICE VISIT (OUTPATIENT)
Dept: CARDIOLOGY | Facility: CLINIC | Age: 74
End: 2024-09-17
Payer: MEDICARE

## 2024-09-17 VITALS — DIASTOLIC BLOOD PRESSURE: 74 MMHG | BODY MASS INDEX: 16.25 KG/M2 | WEIGHT: 130 LBS | SYSTOLIC BLOOD PRESSURE: 122 MMHG

## 2024-09-17 DIAGNOSIS — I48.0 PAROXYSMAL ATRIAL FIBRILLATION (MULTI): Primary | ICD-10-CM

## 2024-09-17 PROCEDURE — 1123F ACP DISCUSS/DSCN MKR DOCD: CPT | Performed by: INTERNAL MEDICINE

## 2024-09-17 PROCEDURE — 99213 OFFICE O/P EST LOW 20 MIN: CPT | Performed by: INTERNAL MEDICINE

## 2024-09-17 PROCEDURE — 93010 ELECTROCARDIOGRAM REPORT: CPT | Performed by: INTERNAL MEDICINE

## 2024-09-17 PROCEDURE — 93005 ELECTROCARDIOGRAM TRACING: CPT | Performed by: INTERNAL MEDICINE

## 2024-09-17 PROCEDURE — 3074F SYST BP LT 130 MM HG: CPT | Performed by: INTERNAL MEDICINE

## 2024-09-17 PROCEDURE — 1159F MED LIST DOCD IN RCRD: CPT | Performed by: INTERNAL MEDICINE

## 2024-09-17 PROCEDURE — 3078F DIAST BP <80 MM HG: CPT | Performed by: INTERNAL MEDICINE

## 2024-09-17 PROCEDURE — 1036F TOBACCO NON-USER: CPT | Performed by: INTERNAL MEDICINE

## 2024-09-17 PROCEDURE — 1126F AMNT PAIN NOTED NONE PRSNT: CPT | Performed by: INTERNAL MEDICINE

## 2024-09-17 ASSESSMENT — ENCOUNTER SYMPTOMS
OCCASIONAL FEELINGS OF UNSTEADINESS: 0
LOSS OF SENSATION IN FEET: 0
DEPRESSION: 0

## 2024-09-17 ASSESSMENT — COLUMBIA-SUICIDE SEVERITY RATING SCALE - C-SSRS
2. HAVE YOU ACTUALLY HAD ANY THOUGHTS OF KILLING YOURSELF?: NO
1. IN THE PAST MONTH, HAVE YOU WISHED YOU WERE DEAD OR WISHED YOU COULD GO TO SLEEP AND NOT WAKE UP?: NO
6. HAVE YOU EVER DONE ANYTHING, STARTED TO DO ANYTHING, OR PREPARED TO DO ANYTHING TO END YOUR LIFE?: NO

## 2024-09-17 ASSESSMENT — PATIENT HEALTH QUESTIONNAIRE - PHQ9
2. FEELING DOWN, DEPRESSED OR HOPELESS: NOT AT ALL
SUM OF ALL RESPONSES TO PHQ9 QUESTIONS 1 AND 2: 0
1. LITTLE INTEREST OR PLEASURE IN DOING THINGS: NOT AT ALL

## 2024-09-17 ASSESSMENT — PAIN SCALES - GENERAL: PAINLEVEL: 0-NO PAIN

## 2024-09-17 NOTE — PROGRESS NOTES
No chief complaint on file.           The patient is a 74-year-old male with a history of hypertension, diastolic dysfunction, and atrial fibrillation.  He is post catheter-based therapy in 2021 and is managed with a hybrid approach utilizing dofetilide.  He is here for regular follow-up.  He is doing well with no significant recurrent arrhythmias.  He continues to walk regularly and has not had any medical issues since I saw him last 4 months ago.         Active Ambulatory Problems     Diagnosis Date Noted    Chronic diastolic congestive heart failure (Multi) 06/21/2023    Enlarged prostate without lower urinary tract symptoms (luts) 06/21/2023    Hypertension 06/21/2023    Prostatitis syndrome 06/21/2023    Renal cyst, acquired, left 06/21/2023    Other forms of angina pectoris (CMS-HCC) 06/21/2023    Cardiomyopathy (Multi) 01/30/2024    Excessive cerumen in ear canal 01/30/2024    History of atrial fibrillation 07/23/2024     Resolved Ambulatory Problems     Diagnosis Date Noted    Atrial fibrillation (Multi) 06/21/2023    Hand pain 01/30/2024    Hyperglycemia 01/30/2024    Obesity with body mass index 30 or greater 01/30/2024    Palpitations 01/30/2024    Syncope 01/30/2024    A-fib (Multi) 04/06/2024    Atrial fibrillation with rapid ventricular response (Multi) 04/07/2024     Past Medical History:   Diagnosis Date    BPH (benign prostatic hyperplasia)     Chronic anticoagulation     Personal history of other diseases of the circulatory system 12/08/2022    Personal history of other endocrine, nutritional and metabolic disease 11/17/2019    Screening for colorectal cancer 03/21/2024        Review of Systems   All other systems reviewed and are negative.       Objective     There were no vitals filed for this visit.     Vitals and nursing note reviewed.   Constitutional:       Appearance: Healthy appearance.   HENT:    Mouth/Throat:      Pharynx: Oropharynx is clear.   Pulmonary:      Effort: Pulmonary effort is  normal.      Breath sounds: Normal breath sounds.   Cardiovascular:      PMI at left midclavicular line. Normal rate. Regular rhythm. Normal S1. Normal S2.       Murmurs: There is a grade 1/6 holosystolic murmur.      No gallop.  No click. No rub.   Pulses:     Intact distal pulses.   Edema:     Peripheral edema absent.   Abdominal:      General: Bowel sounds are normal.   Musculoskeletal:      Cervical back: Normal range of motion. Skin:     General: Skin is warm and dry.   Neurological:      General: No focal deficit present.      Mental Status: Alert and oriented to person, place and time.            Lab Review:   No visits with results within 2 Month(s) from this visit.   Latest known visit with results is:   Admission on 04/06/2024, Discharged on 04/09/2024   Component Date Value    Ventricular Rate 04/06/2024 119     Atrial Rate 04/06/2024 131     QRS Duration 04/06/2024 90     QT Interval 04/06/2024 362     QTC Calculation(Bazett) 04/06/2024 509     R [place] 04/06/2024 -9     T Axis 04/06/2024 -52     QRS Count 04/06/2024 19     Q Onset 04/06/2024 212     T Offset 04/06/2024 393     QTC Fredericia 04/06/2024 454     WBC 04/06/2024 7.0     nRBC 04/06/2024 0.0     RBC 04/06/2024 4.82     Hemoglobin 04/06/2024 14.1     Hematocrit 04/06/2024 42.0     MCV 04/06/2024 87     MCH 04/06/2024 29.3     MCHC 04/06/2024 33.6     RDW 04/06/2024 13.0     Platelets 04/06/2024 233     Neutrophils % 04/06/2024 61.6     Immature Granulocytes %,* 04/06/2024 0.1     Lymphocytes % 04/06/2024 22.7     Monocytes % 04/06/2024 9.8     Eosinophils % 04/06/2024 4.9     Basophils % 04/06/2024 0.9     Neutrophils Absolute 04/06/2024 4.29     Immature Granulocytes Ab* 04/06/2024 0.01     Lymphocytes Absolute 04/06/2024 1.58     Monocytes Absolute 04/06/2024 0.68     Eosinophils Absolute 04/06/2024 0.34     Basophils Absolute 04/06/2024 0.06     Glucose 04/06/2024 123 (H)     Sodium 04/06/2024 136     Potassium 04/06/2024 3.9     Chloride  04/06/2024 101     Bicarbonate 04/06/2024 25     Urea Nitrogen 04/06/2024 14     Creatinine 04/06/2024 0.90     eGFR 04/06/2024 >90     Calcium 04/06/2024 9.4     Albumin 04/06/2024 4.3     Alkaline Phosphatase 04/06/2024 61     Total Protein 04/06/2024 7.1     AST 04/06/2024 13     Bilirubin, Total 04/06/2024 0.3     ALT 04/06/2024 14     Anion Gap 04/06/2024 10     Magnesium 04/06/2024 2.10     PROBNP 04/06/2024 711 (H)     Troponin T, High Sensiti* 04/06/2024 14     Troponin T, High Sensiti* 04/06/2024 8     Magnesium 04/06/2024 2.00     Troponin T, High Sensiti* 04/06/2024 9     WBC 04/07/2024 6.2     nRBC 04/07/2024 0.0     RBC 04/07/2024 4.41 (L)     Hemoglobin 04/07/2024 12.9 (L)     Hematocrit 04/07/2024 38.4 (L)     MCV 04/07/2024 87     MCH 04/07/2024 29.3     MCHC 04/07/2024 33.6     RDW 04/07/2024 13.1     Platelets 04/07/2024 183     Glucose 04/07/2024 107 (H)     Sodium 04/07/2024 140     Potassium 04/07/2024 4.0     Chloride 04/07/2024 107     Bicarbonate 04/07/2024 24     Urea Nitrogen 04/07/2024 13     Creatinine 04/07/2024 0.90     eGFR 04/07/2024 >90     Calcium 04/07/2024 8.8     Albumin 04/07/2024 3.7     Alkaline Phosphatase 04/07/2024 52     Total Protein 04/07/2024 5.9     AST 04/07/2024 11     Bilirubin, Total 04/07/2024 0.4     ALT 04/07/2024 11     Anion Gap 04/07/2024 9     AV pk roro 04/08/2024 0.79     LVOT diam 04/08/2024 2.50     AV mn grad 04/08/2024 1.0     Tricuspid annular plane * 04/08/2024 1.7     LV Biplane EF 04/08/2024 56     LA vol index A/L 04/08/2024 19.5     MV avg E/e' ratio 04/08/2024 4.71     RV free wall pk S' 04/08/2024 15.80     LVIDd 04/08/2024 5.23     AV pk grad 04/08/2024 2.5     Aortic Valve Area by Con* 04/08/2024 6.48     Aortic Valve Area by Con* 04/08/2024 4.82     LV A4C EF 04/08/2024 55.4        ECG:  Normal sinus rhythm at 64 bpm with first AV block VT interval 220 milliseconds QRS duration 90 ms QTc 450 ms    Assessment/plan:  History as above.  Continue  dofetilide.  Doing well.    Problem List Items Addressed This Visit    None  Visit Diagnoses       Paroxysmal atrial fibrillation (Multi)    -  Primary    Relevant Orders    ECG 12 lead (Clinic Performed)

## 2024-09-24 DIAGNOSIS — I48.11 LONGSTANDING PERSISTENT ATRIAL FIBRILLATION (MULTI): ICD-10-CM

## 2024-09-24 RX ORDER — DOFETILIDE 0.5 MG/1
500 CAPSULE ORAL EVERY 12 HOURS
Qty: 180 CAPSULE | Refills: 3 | Status: SHIPPED | OUTPATIENT
Start: 2024-09-24 | End: 2025-09-19

## 2024-09-24 NOTE — TELEPHONE ENCOUNTER
Please send the attached prescription to the patient's pharmacy as soon as possible. Thank you!\    Requested Prescriptions     Pending Prescriptions Disp Refills    dofetilide (Tikosyn) 500 mcg capsule 180 capsule 3     Sig: Take 1 capsule (500 mcg) by mouth every 12 hours.

## 2024-11-11 ENCOUNTER — APPOINTMENT (OUTPATIENT)
Dept: CARDIOLOGY | Facility: CLINIC | Age: 74
End: 2024-11-11
Payer: MEDICARE

## 2025-01-02 ENCOUNTER — APPOINTMENT (OUTPATIENT)
Dept: CARDIOLOGY | Facility: CLINIC | Age: 75
End: 2025-01-02
Payer: MEDICARE

## 2025-01-14 ENCOUNTER — OFFICE VISIT (OUTPATIENT)
Dept: CARDIOLOGY | Facility: CLINIC | Age: 75
End: 2025-01-14
Payer: MEDICARE

## 2025-01-14 VITALS
BODY MASS INDEX: 28.75 KG/M2 | WEIGHT: 230 LBS | DIASTOLIC BLOOD PRESSURE: 72 MMHG | SYSTOLIC BLOOD PRESSURE: 122 MMHG | HEART RATE: 65 BPM

## 2025-01-14 DIAGNOSIS — I48.0 PAROXYSMAL ATRIAL FIBRILLATION (MULTI): Primary | ICD-10-CM

## 2025-01-14 PROCEDURE — 1123F ACP DISCUSS/DSCN MKR DOCD: CPT | Performed by: INTERNAL MEDICINE

## 2025-01-14 PROCEDURE — 1159F MED LIST DOCD IN RCRD: CPT | Performed by: INTERNAL MEDICINE

## 2025-01-14 PROCEDURE — 3078F DIAST BP <80 MM HG: CPT | Performed by: INTERNAL MEDICINE

## 2025-01-14 PROCEDURE — G2211 COMPLEX E/M VISIT ADD ON: HCPCS | Performed by: INTERNAL MEDICINE

## 2025-01-14 PROCEDURE — 93005 ELECTROCARDIOGRAM TRACING: CPT | Performed by: INTERNAL MEDICINE

## 2025-01-14 PROCEDURE — 99213 OFFICE O/P EST LOW 20 MIN: CPT | Mod: 25 | Performed by: INTERNAL MEDICINE

## 2025-01-14 PROCEDURE — 1036F TOBACCO NON-USER: CPT | Performed by: INTERNAL MEDICINE

## 2025-01-14 PROCEDURE — 3074F SYST BP LT 130 MM HG: CPT | Performed by: INTERNAL MEDICINE

## 2025-01-14 PROCEDURE — 1126F AMNT PAIN NOTED NONE PRSNT: CPT | Performed by: INTERNAL MEDICINE

## 2025-01-14 PROCEDURE — 93010 ELECTROCARDIOGRAM REPORT: CPT | Performed by: INTERNAL MEDICINE

## 2025-01-14 PROCEDURE — 99213 OFFICE O/P EST LOW 20 MIN: CPT | Performed by: INTERNAL MEDICINE

## 2025-01-14 ASSESSMENT — COLUMBIA-SUICIDE SEVERITY RATING SCALE - C-SSRS
2. HAVE YOU ACTUALLY HAD ANY THOUGHTS OF KILLING YOURSELF?: NO
6. HAVE YOU EVER DONE ANYTHING, STARTED TO DO ANYTHING, OR PREPARED TO DO ANYTHING TO END YOUR LIFE?: NO
1. IN THE PAST MONTH, HAVE YOU WISHED YOU WERE DEAD OR WISHED YOU COULD GO TO SLEEP AND NOT WAKE UP?: NO

## 2025-01-14 ASSESSMENT — ENCOUNTER SYMPTOMS
LOSS OF SENSATION IN FEET: 0
OCCASIONAL FEELINGS OF UNSTEADINESS: 0
DEPRESSION: 0

## 2025-01-14 ASSESSMENT — PAIN SCALES - GENERAL: PAINLEVEL_OUTOF10: 0-NO PAIN

## 2025-01-14 NOTE — PROGRESS NOTES
No chief complaint on file.           The patient is well-known to me.  He is a 74-year-old male with past medical history significant for hypertension and paroxysmal atrial fibrillation post catheter-based therapy in 2021.  He is maintained on dofetilide and is doing quite well with no known recurrent atrial fibrillation.  This is despite quite a bite of stress at home with his wife recently being diagnosed with cancer.  He remains active and has no other medical issues that have surfaced since I saw him last.         Active Ambulatory Problems     Diagnosis Date Noted    Chronic diastolic congestive heart failure 06/21/2023    Enlarged prostate without lower urinary tract symptoms (luts) 06/21/2023    Hypertension 06/21/2023    Prostatitis syndrome 06/21/2023    Renal cyst, acquired, left 06/21/2023    Other forms of angina pectoris 06/21/2023    Cardiomyopathy 01/30/2024    Excessive cerumen in ear canal 01/30/2024    History of atrial fibrillation 07/23/2024     Resolved Ambulatory Problems     Diagnosis Date Noted    Atrial fibrillation (Multi) 06/21/2023    Hand pain 01/30/2024    Hyperglycemia 01/30/2024    Obesity with body mass index 30 or greater 01/30/2024    Palpitations 01/30/2024    Syncope 01/30/2024    A-fib (Multi) 04/06/2024    Atrial fibrillation with rapid ventricular response (Multi) 04/07/2024     Past Medical History:   Diagnosis Date    BPH (benign prostatic hyperplasia)     Chronic anticoagulation     Personal history of other diseases of the circulatory system 12/08/2022    Personal history of other endocrine, nutritional and metabolic disease 11/17/2019    Screening for colorectal cancer 03/21/2024        Review of Systems   All other systems reviewed and are negative.       Objective     There were no vitals filed for this visit.     Vitals and nursing note reviewed.   Constitutional:       Appearance: Healthy appearance.   HENT:    Mouth/Throat:      Pharynx: Oropharynx is clear.    Pulmonary:      Effort: Pulmonary effort is normal.      Breath sounds: Normal breath sounds.   Cardiovascular:      PMI at left midclavicular line. Normal rate. Regular rhythm. Normal S1. Normal S2.       Murmurs: There is a grade 1/6 holosystolic murmur.      No gallop.  No click. No rub.   Pulses:     Intact distal pulses.   Edema:     Peripheral edema absent.   Abdominal:      General: Bowel sounds are normal.   Musculoskeletal:      Cervical back: Normal range of motion. Skin:     General: Skin is warm and dry.   Neurological:      General: No focal deficit present.      Mental Status: Alert and oriented to person, place and time.            Lab Review:   No visits with results within 2 Month(s) from this visit.   Latest known visit with results is:   Admission on 04/06/2024, Discharged on 04/09/2024   Component Date Value    Ventricular Rate 04/06/2024 119     Atrial Rate 04/06/2024 131     QRS Duration 04/06/2024 90     QT Interval 04/06/2024 362     QTC Calculation(Bazett) 04/06/2024 509     R [place] 04/06/2024 -9     T Axis 04/06/2024 -52     QRS Count 04/06/2024 19     Q Onset 04/06/2024 212     T Offset 04/06/2024 393     QTC Fredericia 04/06/2024 454     WBC 04/06/2024 7.0     nRBC 04/06/2024 0.0     RBC 04/06/2024 4.82     Hemoglobin 04/06/2024 14.1     Hematocrit 04/06/2024 42.0     MCV 04/06/2024 87     MCH 04/06/2024 29.3     MCHC 04/06/2024 33.6     RDW 04/06/2024 13.0     Platelets 04/06/2024 233     Neutrophils % 04/06/2024 61.6     Immature Granulocytes %,* 04/06/2024 0.1     Lymphocytes % 04/06/2024 22.7     Monocytes % 04/06/2024 9.8     Eosinophils % 04/06/2024 4.9     Basophils % 04/06/2024 0.9     Neutrophils Absolute 04/06/2024 4.29     Immature Granulocytes Ab* 04/06/2024 0.01     Lymphocytes Absolute 04/06/2024 1.58     Monocytes Absolute 04/06/2024 0.68     Eosinophils Absolute 04/06/2024 0.34     Basophils Absolute 04/06/2024 0.06     Glucose 04/06/2024 123 (H)     Sodium 04/06/2024 136      Potassium 04/06/2024 3.9     Chloride 04/06/2024 101     Bicarbonate 04/06/2024 25     Urea Nitrogen 04/06/2024 14     Creatinine 04/06/2024 0.90     eGFR 04/06/2024 >90     Calcium 04/06/2024 9.4     Albumin 04/06/2024 4.3     Alkaline Phosphatase 04/06/2024 61     Total Protein 04/06/2024 7.1     AST 04/06/2024 13     Bilirubin, Total 04/06/2024 0.3     ALT 04/06/2024 14     Anion Gap 04/06/2024 10     Magnesium 04/06/2024 2.10     PROBNP 04/06/2024 711 (H)     Troponin T, High Sensiti* 04/06/2024 14     Troponin T, High Sensiti* 04/06/2024 8     Magnesium 04/06/2024 2.00     Troponin T, High Sensiti* 04/06/2024 9     WBC 04/07/2024 6.2     nRBC 04/07/2024 0.0     RBC 04/07/2024 4.41 (L)     Hemoglobin 04/07/2024 12.9 (L)     Hematocrit 04/07/2024 38.4 (L)     MCV 04/07/2024 87     MCH 04/07/2024 29.3     MCHC 04/07/2024 33.6     RDW 04/07/2024 13.1     Platelets 04/07/2024 183     Glucose 04/07/2024 107 (H)     Sodium 04/07/2024 140     Potassium 04/07/2024 4.0     Chloride 04/07/2024 107     Bicarbonate 04/07/2024 24     Urea Nitrogen 04/07/2024 13     Creatinine 04/07/2024 0.90     eGFR 04/07/2024 >90     Calcium 04/07/2024 8.8     Albumin 04/07/2024 3.7     Alkaline Phosphatase 04/07/2024 52     Total Protein 04/07/2024 5.9     AST 04/07/2024 11     Bilirubin, Total 04/07/2024 0.4     ALT 04/07/2024 11     Anion Gap 04/07/2024 9     AV pk roro 04/08/2024 0.79     LVOT diam 04/08/2024 2.50     AV mn grad 04/08/2024 1.0     Tricuspid annular plane * 04/08/2024 1.7     LV Biplane EF 04/08/2024 56     LA vol index A/L 04/08/2024 19.5     MV avg E/e' ratio 04/08/2024 4.71     RV free wall pk S' 04/08/2024 15.80     LVIDd 04/08/2024 5.23     AV pk grad 04/08/2024 2.5     Aortic Valve Area by Con* 04/08/2024 6.48     Aortic Valve Area by Con* 04/08/2024 4.82     LV A4C EF 04/08/2024 55.4        ECG:  Normal sinus rhythm at 65 bpm GA interval 210 ms QRS duration 98 ms QTc 440 ms    Assessment/plan:  History as  above.  Doing well with dofetilide.  Blood work.  Follow-up in 4 months.    Problem List Items Addressed This Visit    None  Visit Diagnoses       Paroxysmal atrial fibrillation (Multi)    -  Primary

## 2025-01-15 ENCOUNTER — APPOINTMENT (OUTPATIENT)
Dept: CARDIOLOGY | Facility: CLINIC | Age: 75
End: 2025-01-15
Payer: MEDICARE

## 2025-01-15 VITALS
SYSTOLIC BLOOD PRESSURE: 113 MMHG | HEIGHT: 75 IN | RESPIRATION RATE: 18 BRPM | TEMPERATURE: 98.6 F | HEART RATE: 67 BPM | WEIGHT: 230 LBS | DIASTOLIC BLOOD PRESSURE: 69 MMHG | BODY MASS INDEX: 28.6 KG/M2 | OXYGEN SATURATION: 98 %

## 2025-01-15 DIAGNOSIS — I42.0 DILATED CARDIOMYOPATHY (MULTI): ICD-10-CM

## 2025-01-15 DIAGNOSIS — I10 PRIMARY HYPERTENSION: ICD-10-CM

## 2025-01-15 DIAGNOSIS — Z86.79 HISTORY OF ATRIAL FIBRILLATION: ICD-10-CM

## 2025-01-15 DIAGNOSIS — I50.32 CHRONIC DIASTOLIC CONGESTIVE HEART FAILURE: ICD-10-CM

## 2025-01-15 DIAGNOSIS — I20.89 OTHER FORMS OF ANGINA PECTORIS: ICD-10-CM

## 2025-01-15 DIAGNOSIS — I48.0 PAROXYSMAL ATRIAL FIBRILLATION (MULTI): ICD-10-CM

## 2025-01-15 DIAGNOSIS — E78.2 MIXED HYPERLIPIDEMIA: Primary | ICD-10-CM

## 2025-01-15 PROCEDURE — 1126F AMNT PAIN NOTED NONE PRSNT: CPT | Performed by: INTERNAL MEDICINE

## 2025-01-15 PROCEDURE — 3008F BODY MASS INDEX DOCD: CPT | Performed by: INTERNAL MEDICINE

## 2025-01-15 PROCEDURE — 1123F ACP DISCUSS/DSCN MKR DOCD: CPT | Performed by: INTERNAL MEDICINE

## 2025-01-15 PROCEDURE — 99214 OFFICE O/P EST MOD 30 MIN: CPT | Performed by: INTERNAL MEDICINE

## 2025-01-15 PROCEDURE — 1159F MED LIST DOCD IN RCRD: CPT | Performed by: INTERNAL MEDICINE

## 2025-01-15 PROCEDURE — 3074F SYST BP LT 130 MM HG: CPT | Performed by: INTERNAL MEDICINE

## 2025-01-15 PROCEDURE — 3078F DIAST BP <80 MM HG: CPT | Performed by: INTERNAL MEDICINE

## 2025-01-15 PROCEDURE — 1160F RVW MEDS BY RX/DR IN RCRD: CPT | Performed by: INTERNAL MEDICINE

## 2025-01-15 ASSESSMENT — LIFESTYLE VARIABLES
HOW OFTEN DURING THE LAST YEAR HAVE YOU BEEN UNABLE TO REMEMBER WHAT HAPPENED THE NIGHT BEFORE BECAUSE YOU HAD BEEN DRINKING: NEVER
HOW OFTEN DO YOU HAVE A DRINK CONTAINING ALCOHOL: 2-4 TIMES A MONTH
HOW OFTEN DURING THE LAST YEAR HAVE YOU FOUND THAT YOU WERE NOT ABLE TO STOP DRINKING ONCE YOU HAD STARTED: NEVER
AUDIT-C TOTAL SCORE: 2
SKIP TO QUESTIONS 9-10: 1
HOW MANY STANDARD DRINKS CONTAINING ALCOHOL DO YOU HAVE ON A TYPICAL DAY: 1 OR 2
HOW OFTEN DURING THE LAST YEAR HAVE YOU HAD A FEELING OF GUILT OR REMORSE AFTER DRINKING: NEVER
HOW OFTEN DURING THE LAST YEAR HAVE YOU FAILED TO DO WHAT WAS NORMALLY EXPECTED FROM YOU BECAUSE OF DRINKING: NEVER
HOW OFTEN DURING THE LAST YEAR HAVE YOU NEEDED AN ALCOHOLIC DRINK FIRST THING IN THE MORNING TO GET YOURSELF GOING AFTER A NIGHT OF HEAVY DRINKING: NEVER
AUDIT TOTAL SCORE: 2
HOW OFTEN DO YOU HAVE SIX OR MORE DRINKS ON ONE OCCASION: NEVER
HAVE YOU OR SOMEONE ELSE BEEN INJURED AS A RESULT OF YOUR DRINKING: NO
HAS A RELATIVE, FRIEND, DOCTOR, OR ANOTHER HEALTH PROFESSIONAL EXPRESSED CONCERN ABOUT YOUR DRINKING OR SUGGESTED YOU CUT DOWN: NO

## 2025-01-15 ASSESSMENT — ENCOUNTER SYMPTOMS
OCCASIONAL FEELINGS OF UNSTEADINESS: 0
DEPRESSION: 0
LOSS OF SENSATION IN FEET: 0

## 2025-01-15 ASSESSMENT — PAIN SCALES - GENERAL: PAINLEVEL_OUTOF10: 0-NO PAIN

## 2025-01-15 NOTE — PROGRESS NOTES
History of present illness:  Patient with history of heart failure diastolic dysfunction, paroxysmal atrial fibrillation on oral anticoagulation status post ablation on Tikosyn.  Patient returns to my office for follow-up.  Feeling overall okay.  Denies any chest pain or shortness of breath.  No palpitations dizziness lightheadedness or syncope.  Saw Dr. Peterson yesterday and his EKG was okay.  Echocardiogram in April 2024 showed ejection fraction of 55 to 60% without major valve abnormalities.  Past Medical History:   Diagnosis Date    Atrial fibrillation (Multi) 06/21/2023    Hx of ablation    BPH (benign prostatic hyperplasia)     Cardiomyopathy 01/30/2024    Cammie Willams MD  Physician : Cardiology    Chronic anticoagulation     Eliquis    Chronic diastolic congestive heart failure 06/21/2023    Hypertension 06/21/2023    Other forms of angina pectoris 06/21/2023    Palpitations 01/30/2024    Personal history of other diseases of the circulatory system 12/08/2022    History of atrial fibrillation    Personal history of other endocrine, nutritional and metabolic disease 11/17/2019    History of hyperglycemia    Screening for colorectal cancer 03/21/2024    Syncope 01/30/2024       Past Surgical History:   Procedure Laterality Date    CARDIOVERSION  04/08/2024    COLONOSCOPY  05/29/2013    Complete Colonoscopy    OTHER SURGICAL HISTORY  05/29/2013    Stress Test ECG Performed       No Known Allergies     reports that he has never smoked. He has never been exposed to tobacco smoke. He has never used smokeless tobacco. He reports current alcohol use. He reports that he does not use drugs.    Family History   Problem Relation Name Age of Onset    Lymphoma Mother      Sudden death Father  44        heart attack       Patient's Medications   New Prescriptions    No medications on file   Previous Medications    AMLODIPINE (NORVASC) 10 MG TABLET    Take 1 tablet (10 mg) by mouth once daily.    CARVEDILOL (COREG) 25  MG TABLET    Take 1 tablet (25 mg) by mouth 2 times a day with meals.    DOFETILIDE (TIKOSYN) 500 MCG CAPSULE    Take 1 capsule (500 mcg) by mouth every 12 hours.    ELIQUIS 5 MG TABLET    Take 1 tablet (5 mg) by mouth 2 times a day.    ENTRESTO  MG TABLET    Take 1 tablet by mouth 2 times a day.    TAMSULOSIN (FLOMAX) 0.4 MG 24 HR CAPSULE    TAKE 1 CAPSULE DAILY, ONE-HALF HOUR FOLLOWING THE SAME MEAL DAILY   Modified Medications    No medications on file   Discontinued Medications    No medications on file       Objective   Physical Exam  General: Patient in no acute distress   HEENT: Atraumatic normocephalic.  Neck: Supple, jugular venous pressure within normal limit.  No bruits  Lungs: Clear to auscultation bilaterally  Cardiovascular: Regular rate and rhythm, normal heart sounds, no murmurs rubs or gallops  Abdomen: Soft nontender nondistended.  Normal bowel sounds.  Extremities: Warm to touch, no edema.    Lab Review   No visits with results within 2 Month(s) from this visit.   Latest known visit with results is:   Admission on 04/06/2024, Discharged on 04/09/2024   Component Date Value    Ventricular Rate 04/06/2024 119     Atrial Rate 04/06/2024 131     QRS Duration 04/06/2024 90     QT Interval 04/06/2024 362     QTC Calculation(Bazett) 04/06/2024 509     R [place] 04/06/2024 -9     T Axis 04/06/2024 -52     QRS Count 04/06/2024 19     Q Onset 04/06/2024 212     T Offset 04/06/2024 393     QTC Fredericia 04/06/2024 454     WBC 04/06/2024 7.0     nRBC 04/06/2024 0.0     RBC 04/06/2024 4.82     Hemoglobin 04/06/2024 14.1     Hematocrit 04/06/2024 42.0     MCV 04/06/2024 87     MCH 04/06/2024 29.3     MCHC 04/06/2024 33.6     RDW 04/06/2024 13.0     Platelets 04/06/2024 233     Neutrophils % 04/06/2024 61.6     Immature Granulocytes %,* 04/06/2024 0.1     Lymphocytes % 04/06/2024 22.7     Monocytes % 04/06/2024 9.8     Eosinophils % 04/06/2024 4.9     Basophils % 04/06/2024 0.9     Neutrophils Absolute  04/06/2024 4.29     Immature Granulocytes Ab* 04/06/2024 0.01     Lymphocytes Absolute 04/06/2024 1.58     Monocytes Absolute 04/06/2024 0.68     Eosinophils Absolute 04/06/2024 0.34     Basophils Absolute 04/06/2024 0.06     Glucose 04/06/2024 123 (H)     Sodium 04/06/2024 136     Potassium 04/06/2024 3.9     Chloride 04/06/2024 101     Bicarbonate 04/06/2024 25     Urea Nitrogen 04/06/2024 14     Creatinine 04/06/2024 0.90     eGFR 04/06/2024 >90     Calcium 04/06/2024 9.4     Albumin 04/06/2024 4.3     Alkaline Phosphatase 04/06/2024 61     Total Protein 04/06/2024 7.1     AST 04/06/2024 13     Bilirubin, Total 04/06/2024 0.3     ALT 04/06/2024 14     Anion Gap 04/06/2024 10     Magnesium 04/06/2024 2.10     PROBNP 04/06/2024 711 (H)     Troponin T, High Sensiti* 04/06/2024 14     Troponin T, High Sensiti* 04/06/2024 8     Magnesium 04/06/2024 2.00     Troponin T, High Sensiti* 04/06/2024 9     WBC 04/07/2024 6.2     nRBC 04/07/2024 0.0     RBC 04/07/2024 4.41 (L)     Hemoglobin 04/07/2024 12.9 (L)     Hematocrit 04/07/2024 38.4 (L)     MCV 04/07/2024 87     MCH 04/07/2024 29.3     MCHC 04/07/2024 33.6     RDW 04/07/2024 13.1     Platelets 04/07/2024 183     Glucose 04/07/2024 107 (H)     Sodium 04/07/2024 140     Potassium 04/07/2024 4.0     Chloride 04/07/2024 107     Bicarbonate 04/07/2024 24     Urea Nitrogen 04/07/2024 13     Creatinine 04/07/2024 0.90     eGFR 04/07/2024 >90     Calcium 04/07/2024 8.8     Albumin 04/07/2024 3.7     Alkaline Phosphatase 04/07/2024 52     Total Protein 04/07/2024 5.9     AST 04/07/2024 11     Bilirubin, Total 04/07/2024 0.4     ALT 04/07/2024 11     Anion Gap 04/07/2024 9     AV pk roro 04/08/2024 0.79     LVOT diam 04/08/2024 2.50     AV mn grad 04/08/2024 1.0     Tricuspid annular plane * 04/08/2024 1.7     LV Biplane EF 04/08/2024 56     LA vol index A/L 04/08/2024 19.5     MV avg E/e' ratio 04/08/2024 4.71     RV free wall pk S' 04/08/2024 15.80     LVIDd 04/08/2024 5.23      AV pk grad 04/08/2024 2.5     Aortic Valve Area by Con* 04/08/2024 6.48     Aortic Valve Area by Con* 04/08/2024 4.82     LV A4C EF 04/08/2024 55.4         Assessment/Plan   Patient Active Problem List   Diagnosis    Chronic diastolic congestive heart failure    Enlarged prostate without lower urinary tract symptoms (luts)    Hypertension    Prostatitis syndrome    Renal cyst, acquired, left    Other forms of angina pectoris    Cardiomyopathy    Excessive cerumen in ear canal    History of atrial fibrillation      Patient with history of heart failure diastolic dysfunction, paroxysmal atrial fibrillation on oral anticoagulation status post ablation on Tikosyn.  Patient returns to my office for follow-up.  Feeling overall okay.  Denies any chest pain or shortness of breath.  No palpitations dizziness lightheadedness or syncope.  Saw Dr. Peterson yesterday and his EKG was okay.  Echocardiogram in April 2024 showed ejection fraction of 55 to 60% without major valve abnormalities.    At this point my recommendation is to continue current medications.  Discussed with patient lifestyle modification.  Will obtain blood work including basic metabolic panel CBC with differential and lipid panel.  I will follow-up in 6 months.    Cammie Willams MD

## 2025-01-16 ENCOUNTER — TELEPHONE (OUTPATIENT)
Dept: PRIMARY CARE | Facility: CLINIC | Age: 75
End: 2025-01-16
Payer: MEDICARE

## 2025-01-16 NOTE — TELEPHONE ENCOUNTER
Patient called wondering if you would see his wife as a new patient. She was recently diagnosed with cancer and has no PCP he said.

## 2025-01-21 NOTE — PROGRESS NOTES
"Subjective   Reason for Visit: Alcides Tovar is an 74 y.o. male here for a Medicare Wellness visit.     Past Medical, Surgical, and Family History reviewed and updated in chart.    Reviewed all medications by prescribing practitioner or clinical pharmacist (such as prescriptions, OTCs, herbal therapies and supplements) and documented in the medical record.    HPI  Alcides is a 75 yo est male presenting today for AWV   LOV: July 2024     Dx: HTN/Afib, BPH     Pt reports doing well  Wife is in need of PCP, pt requesting to bring her here  She was recently dx'd with cancer, mult sites     Pt was seen by cardiology last week  Labs ordered   Pt will go to lab tomorrow      #HTN/AFIB  Afib dx'd 2021  Followed by cardiology, Dr. Willams  Electrophys: Dr. Peterson (ablation)  Rx: Coreg 25 mg BID, Amlodipine 10 mg daily, Eliquis BID, Entresto BID, Dofetilide BID  had ablation in Aug 2021 and again in April 2024 per Dr. Connelly   BP today= 134/72  LAST ECHO:  4/2024     #BPH  Dx'd 10 yrs ago   Taking Flomax 0.4 mg daily  sx: controlled      #HM  FBW: ordered per cardiology   PSA: 2022  COLON: UTD      No Known Allergies    Patient Care Team:  IHSAN Sneed as PCP - General (Family Medicine)  IHSAN Mishra as PCP - [employer] PCP  IHSAN Sneed       Review of Systems  ROS was completed and all systems are negative with the exception of what was noted in the the HPI.       Objective   Vitals:  /72   Pulse 79   Ht 1.905 m (6' 3\")   Wt 106 kg (232 lb 12.8 oz)   SpO2 96%   BMI 29.10 kg/m²       Current Outpatient Medications   Medication Instructions    amLODIPine (NORVASC) 10 mg, oral, Daily    carvedilol (COREG) 25 mg, oral, 2 times daily (morning and late afternoon)    dofetilide (TIKOSYN) 500 mcg, oral, Every 12 hours    Eliquis 5 mg, oral, 2 times daily    Entresto  mg tablet 1 tablet, oral, 2 times daily    tamsulosin (Flomax) 0.4 mg 24 hr capsule TAKE 1 " CAPSULE DAILY, ONE-HALF HOUR FOLLOWING THE SAME MEAL DAILY         Physical Exam  Vitals reviewed.   Cardiovascular:      Pulses: Normal pulses.      Heart sounds: Normal heart sounds.   Pulmonary:      Effort: Pulmonary effort is normal.      Breath sounds: Normal breath sounds.   Neurological:      Mental Status: He is alert and oriented to person, place, and time.           Assessment & Plan  Medicare annual wellness visit, subsequent  - Counseled on healthy diet and regular exercise  - Fall avoidance information provided  - Personalized prevention plan provided   - Colon UTD  - Vaccines; declines   - FBW ordered       Primary hypertension  Stable today: 134/72  Continue Amlodipine daily        History of atrial fibrillation  Followed by cardiology  Continue Coreg, Tikosyn, Eliquis and Entresto as prescribed        Screening for multiple conditions  Depression screening completed using PHQ-2 questions with results documented in the chart/encounter (15 minutes)  See rooming screening section for documentation and/or progress note for additional information.         Advanced directives, counseling/discussion  I spent > 16 minutes face to face with Alcides Tovar discussing his/her advance directives, including a Living Will, Healthcare POA as well as specific end of life choices and/or directives, and pt was provided with packet to return next visit.    HCPOA: wife   Pt is Full Code         Screening for prostate cancer    Orders:    PSA screen; Future    Routine general medical examination at health care facility    Orders:    1 Year Follow Up In Primary Care - Wellness Exam; Future

## 2025-01-22 ENCOUNTER — APPOINTMENT (OUTPATIENT)
Dept: PRIMARY CARE | Facility: CLINIC | Age: 75
End: 2025-01-22
Payer: MEDICARE

## 2025-01-22 VITALS
HEART RATE: 79 BPM | OXYGEN SATURATION: 96 % | DIASTOLIC BLOOD PRESSURE: 72 MMHG | WEIGHT: 232.8 LBS | SYSTOLIC BLOOD PRESSURE: 134 MMHG | BODY MASS INDEX: 28.95 KG/M2 | HEIGHT: 75 IN

## 2025-01-22 DIAGNOSIS — I10 PRIMARY HYPERTENSION: ICD-10-CM

## 2025-01-22 DIAGNOSIS — Z71.89 ADVANCED DIRECTIVES, COUNSELING/DISCUSSION: ICD-10-CM

## 2025-01-22 DIAGNOSIS — Z12.5 SCREENING FOR PROSTATE CANCER: ICD-10-CM

## 2025-01-22 DIAGNOSIS — Z00.00 MEDICARE ANNUAL WELLNESS VISIT, SUBSEQUENT: Primary | ICD-10-CM

## 2025-01-22 DIAGNOSIS — Z13.89 SCREENING FOR MULTIPLE CONDITIONS: ICD-10-CM

## 2025-01-22 DIAGNOSIS — Z00.00 ROUTINE GENERAL MEDICAL EXAMINATION AT HEALTH CARE FACILITY: ICD-10-CM

## 2025-01-22 DIAGNOSIS — Z86.79 HISTORY OF ATRIAL FIBRILLATION: ICD-10-CM

## 2025-01-22 PROCEDURE — G0439 PPPS, SUBSEQ VISIT: HCPCS | Performed by: NURSE PRACTITIONER

## 2025-01-22 PROCEDURE — 1160F RVW MEDS BY RX/DR IN RCRD: CPT | Performed by: NURSE PRACTITIONER

## 2025-01-22 PROCEDURE — 1036F TOBACCO NON-USER: CPT | Performed by: NURSE PRACTITIONER

## 2025-01-22 PROCEDURE — 1158F ADVNC CARE PLAN TLK DOCD: CPT | Performed by: NURSE PRACTITIONER

## 2025-01-22 PROCEDURE — G0444 DEPRESSION SCREEN ANNUAL: HCPCS | Performed by: NURSE PRACTITIONER

## 2025-01-22 PROCEDURE — 1159F MED LIST DOCD IN RCRD: CPT | Performed by: NURSE PRACTITIONER

## 2025-01-22 PROCEDURE — 1170F FXNL STATUS ASSESSED: CPT | Performed by: NURSE PRACTITIONER

## 2025-01-22 PROCEDURE — 3008F BODY MASS INDEX DOCD: CPT | Performed by: NURSE PRACTITIONER

## 2025-01-22 PROCEDURE — 99213 OFFICE O/P EST LOW 20 MIN: CPT | Performed by: NURSE PRACTITIONER

## 2025-01-22 PROCEDURE — 99397 PER PM REEVAL EST PAT 65+ YR: CPT | Performed by: NURSE PRACTITIONER

## 2025-01-22 PROCEDURE — 3075F SYST BP GE 130 - 139MM HG: CPT | Performed by: NURSE PRACTITIONER

## 2025-01-22 PROCEDURE — 3078F DIAST BP <80 MM HG: CPT | Performed by: NURSE PRACTITIONER

## 2025-01-22 PROCEDURE — 99497 ADVNCD CARE PLAN 30 MIN: CPT | Performed by: NURSE PRACTITIONER

## 2025-01-22 PROCEDURE — 1123F ACP DISCUSS/DSCN MKR DOCD: CPT | Performed by: NURSE PRACTITIONER

## 2025-01-22 ASSESSMENT — PATIENT HEALTH QUESTIONNAIRE - PHQ9
1. LITTLE INTEREST OR PLEASURE IN DOING THINGS: NOT AT ALL
2. FEELING DOWN, DEPRESSED OR HOPELESS: NOT AT ALL
SUM OF ALL RESPONSES TO PHQ9 QUESTIONS 1 AND 2: 0

## 2025-01-22 ASSESSMENT — ENCOUNTER SYMPTOMS
LOSS OF SENSATION IN FEET: 0
OCCASIONAL FEELINGS OF UNSTEADINESS: 0
DEPRESSION: 0

## 2025-01-22 ASSESSMENT — ACTIVITIES OF DAILY LIVING (ADL)
BATHING: INDEPENDENT
DRESSING: INDEPENDENT
TAKING_MEDICATION: INDEPENDENT
MANAGING_FINANCES: INDEPENDENT
DOING_HOUSEWORK: INDEPENDENT
GROCERY_SHOPPING: INDEPENDENT

## 2025-01-22 NOTE — ASSESSMENT & PLAN NOTE
I spent > 16 minutes face to face with Alcides Tovar discussing his/her advance directives, including a Living Will, Healthcare POA as well as specific end of life choices and/or directives, and pt was provided with packet to return next visit.    HCPOA: wife   Pt is Full Code

## 2025-01-22 NOTE — PATIENT INSTRUCTIONS
Thank you for seeing me today Alcides Tovar, it was a pleasure to see you again!    Today we did your Annual Medicare Wellness Exam and discussed the following:     Continue medications as prescribed    Lab work ordered today.  Please have your blood drawn in the next 1-2 weeks.  You need to be FASTING for 12 hours prior to blood draw.  You may only have water.  Please contact your insurance company to ask about the best location to get blood drawn.  We will contact you with the results of your blood work and any necessary adjustments  to your plan of care, if you do not hear from us within 3-5 days of having your blood drawn, please call the office at [phone].      For assistance with scheduling referrals or consultations, please call [phone] or [phone].    For laboratory, radiology, and other tests, please call [phone] ([phone] for pediatrics).   For laboratory locations, please visit [email or web address]   If you do not get results within 7-10 days, or you have any questions or concerns, please send a message, call the office ([phone]), or return to the office for a follow-up appointment.     For acute/sick visits, if you are unable to get an office visit, you can do a  On Demand Virtual Visit that is accessible via your My Chart account.  For emergencies, call 9-1-1 or go to the nearest Emergency Department.     Please schedule additional appointment(s) to address concern(s) not addressed today.    Please review prescription inserts and published information for possible adverse effects of all medications.     In general, results are discussed over the phone or via  [employer].     You can see your health information, review clinical summaries from office visits & test results online when you follow your health with MY  Chart, a personal health record.   To sign up go to [email or web address].   If you need assistance with signing up  or trouble getting into your account call [facility] Patient Line 24/7 at [phone]     RTC ANNUALLY AND AS NEEDED     Happy New Year!    Jerilyn Calderón NP

## 2025-01-22 NOTE — ASSESSMENT & PLAN NOTE
- Counseled on healthy diet and regular exercise  - Fall avoidance information provided  - Personalized prevention plan provided   - Colon UTD  - Vaccines; declines   - FBW ordered

## 2025-01-28 LAB
ANION GAP SERPL CALCULATED.4IONS-SCNC: 10 MMOL/L (CALC) (ref 7–17)
BUN SERPL-MCNC: 14 MG/DL (ref 7–25)
BUN/CREAT SERPL: NORMAL (CALC) (ref 6–22)
CALCIUM SERPL-MCNC: 8.9 MG/DL (ref 8.6–10.3)
CHLORIDE SERPL-SCNC: 106 MMOL/L (ref 98–110)
CO2 SERPL-SCNC: 25 MMOL/L (ref 20–32)
CREAT SERPL-MCNC: 0.81 MG/DL (ref 0.7–1.28)
EGFRCR SERPLBLD CKD-EPI 2021: 94 ML/MIN/1.73M2
GLUCOSE SERPL-MCNC: 99 MG/DL (ref 65–99)
MAGNESIUM SERPL-MCNC: 2.1 MG/DL (ref 1.5–2.5)
POTASSIUM SERPL-SCNC: 4.5 MMOL/L (ref 3.5–5.3)
PSA SERPL-MCNC: 2.24 NG/ML
SODIUM SERPL-SCNC: 141 MMOL/L (ref 135–146)

## 2025-01-29 LAB
BASOPHILS # BLD AUTO: 38 CELLS/UL (ref 0–200)
BASOPHILS NFR BLD AUTO: 0.8 %
CHOLEST SERPL-MCNC: 156 MG/DL
CHOLEST/HDLC SERPL: 2.3 (CALC)
EOSINOPHIL # BLD AUTO: 259 CELLS/UL (ref 15–500)
EOSINOPHIL NFR BLD AUTO: 5.4 %
ERYTHROCYTE [DISTWIDTH] IN BLOOD BY AUTOMATED COUNT: 13.1 % (ref 11–15)
HCT VFR BLD AUTO: 39.8 % (ref 38.5–50)
HDLC SERPL-MCNC: 67 MG/DL
HGB BLD-MCNC: 12.8 G/DL (ref 13.2–17.1)
LDLC SERPL CALC-MCNC: 78 MG/DL (CALC)
LYMPHOCYTES # BLD AUTO: 1411 CELLS/UL (ref 850–3900)
LYMPHOCYTES NFR BLD AUTO: 29.4 %
MCH RBC QN AUTO: 29.6 PG (ref 27–33)
MCHC RBC AUTO-ENTMCNC: 32.2 G/DL (ref 32–36)
MCV RBC AUTO: 92.1 FL (ref 80–100)
MONOCYTES # BLD AUTO: 562 CELLS/UL (ref 200–950)
MONOCYTES NFR BLD AUTO: 11.7 %
NEUTROPHILS # BLD AUTO: 2530 CELLS/UL (ref 1500–7800)
NEUTROPHILS NFR BLD AUTO: 52.7 %
NONHDLC SERPL-MCNC: 89 MG/DL (CALC)
PLATELET # BLD AUTO: 201 THOUSAND/UL (ref 140–400)
PMV BLD REES-ECKER: 11.4 FL (ref 7.5–12.5)
RBC # BLD AUTO: 4.32 MILLION/UL (ref 4.2–5.8)
TRIGL SERPL-MCNC: 40 MG/DL
WBC # BLD AUTO: 4.8 THOUSAND/UL (ref 3.8–10.8)

## 2025-03-12 DIAGNOSIS — N40.0 ENLARGED PROSTATE WITHOUT LOWER URINARY TRACT SYMPTOMS (LUTS): ICD-10-CM

## 2025-03-12 RX ORDER — TAMSULOSIN HYDROCHLORIDE 0.4 MG/1
0.4 CAPSULE ORAL DAILY
Qty: 90 CAPSULE | Refills: 3 | Status: SHIPPED | OUTPATIENT
Start: 2025-03-12

## 2025-04-02 DIAGNOSIS — I48.91 ATRIAL FIBRILLATION WITH RAPID VENTRICULAR RESPONSE (MULTI): ICD-10-CM

## 2025-04-02 DIAGNOSIS — I10 PRIMARY HYPERTENSION: ICD-10-CM

## 2025-04-02 RX ORDER — CARVEDILOL 25 MG/1
25 TABLET ORAL
Qty: 180 TABLET | Refills: 3 | Status: SHIPPED | OUTPATIENT
Start: 2025-04-02 | End: 2026-03-28

## 2025-04-15 DIAGNOSIS — I48.91 ATRIAL FIBRILLATION WITH RAPID VENTRICULAR RESPONSE (MULTI): ICD-10-CM

## 2025-04-15 DIAGNOSIS — I10 PRIMARY HYPERTENSION: ICD-10-CM

## 2025-04-16 RX ORDER — AMLODIPINE BESYLATE 10 MG/1
10 TABLET ORAL DAILY
Qty: 90 TABLET | Refills: 3 | Status: SHIPPED | OUTPATIENT
Start: 2025-04-16 | End: 2026-04-11

## 2025-05-27 ENCOUNTER — OFFICE VISIT (OUTPATIENT)
Facility: CLINIC | Age: 75
End: 2025-05-27
Payer: MEDICARE

## 2025-05-27 VITALS — DIASTOLIC BLOOD PRESSURE: 80 MMHG | HEART RATE: 64 BPM | SYSTOLIC BLOOD PRESSURE: 122 MMHG

## 2025-05-27 DIAGNOSIS — I48.0 PAROXYSMAL ATRIAL FIBRILLATION (MULTI): Primary | ICD-10-CM

## 2025-05-27 LAB
ATRIAL RATE: 64 BPM
P AXIS: 81 DEGREES
P OFFSET: 174 MS
P ONSET: 113 MS
PR INTERVAL: 210 MS
Q ONSET: 218 MS
QRS COUNT: 11 BEATS
QRS DURATION: 90 MS
QT INTERVAL: 452 MS
QTC CALCULATION(BAZETT): 466 MS
QTC FREDERICIA: 461 MS
R AXIS: 52 DEGREES
T AXIS: 33 DEGREES
T OFFSET: 444 MS
VENTRICULAR RATE: 64 BPM

## 2025-05-27 PROCEDURE — 3079F DIAST BP 80-89 MM HG: CPT | Performed by: INTERNAL MEDICINE

## 2025-05-27 PROCEDURE — 99213 OFFICE O/P EST LOW 20 MIN: CPT | Performed by: INTERNAL MEDICINE

## 2025-05-27 PROCEDURE — G2211 COMPLEX E/M VISIT ADD ON: HCPCS | Performed by: INTERNAL MEDICINE

## 2025-05-27 PROCEDURE — 93005 ELECTROCARDIOGRAM TRACING: CPT | Performed by: INTERNAL MEDICINE

## 2025-05-27 PROCEDURE — 1159F MED LIST DOCD IN RCRD: CPT | Performed by: INTERNAL MEDICINE

## 2025-05-27 PROCEDURE — 3074F SYST BP LT 130 MM HG: CPT | Performed by: INTERNAL MEDICINE

## 2025-05-27 ASSESSMENT — PATIENT HEALTH QUESTIONNAIRE - PHQ9
2. FEELING DOWN, DEPRESSED OR HOPELESS: NOT AT ALL
1. LITTLE INTEREST OR PLEASURE IN DOING THINGS: NOT AT ALL
SUM OF ALL RESPONSES TO PHQ9 QUESTIONS 1 AND 2: 0

## 2025-05-27 ASSESSMENT — COLUMBIA-SUICIDE SEVERITY RATING SCALE - C-SSRS
6. HAVE YOU EVER DONE ANYTHING, STARTED TO DO ANYTHING, OR PREPARED TO DO ANYTHING TO END YOUR LIFE?: NO
2. HAVE YOU ACTUALLY HAD ANY THOUGHTS OF KILLING YOURSELF?: NO
1. IN THE PAST MONTH, HAVE YOU WISHED YOU WERE DEAD OR WISHED YOU COULD GO TO SLEEP AND NOT WAKE UP?: NO

## 2025-05-27 NOTE — PROGRESS NOTES
Chief Complaint   Patient presents with    Atrial Fibrillation     4 month follow up        The patient is a 75-year-old male with a history of hypertension and paroxysmal atrial fibrillation post catheter-based therapy about 4 years ago.  He is maintained with a hybrid approach utilizing dofetilide and seeing me for regular follow-up.  He has no recurrences.  He feels well and walks regularly Difficulty.    Atrial Fibrillation  Past medical history includes atrial fibrillation.          Active Ambulatory Problems     Diagnosis Date Noted    Chronic diastolic congestive heart failure 06/21/2023    Enlarged prostate without lower urinary tract symptoms (luts) 06/21/2023    Hypertension 06/21/2023    Prostatitis syndrome 06/21/2023    Renal cyst, acquired, left 06/21/2023    Other forms of angina pectoris 06/21/2023    Cardiomyopathy 01/30/2024    Excessive cerumen in ear canal 01/30/2024    History of atrial fibrillation 07/23/2024    Advanced directives, counseling/discussion 01/22/2025    Screening for multiple conditions 01/22/2025    Medicare annual wellness visit, subsequent 01/22/2025     Resolved Ambulatory Problems     Diagnosis Date Noted    Atrial fibrillation (Multi) 06/21/2023    Hand pain 01/30/2024    Hyperglycemia 01/30/2024    Obesity with body mass index 30 or greater 01/30/2024    Palpitations 01/30/2024    Syncope 01/30/2024    A-fib (Multi) 04/06/2024    Atrial fibrillation with rapid ventricular response (Multi) 04/07/2024     Past Medical History:   Diagnosis Date    BPH (benign prostatic hyperplasia)     Chronic anticoagulation     Personal history of other diseases of the circulatory system 12/08/2022    Personal history of other endocrine, nutritional and metabolic disease 11/17/2019    Screening for colorectal cancer 03/21/2024        Review of Systems   All other systems reviewed and are negative.         Current Outpatient Medications   Medication Instructions    amLODIPine (NORVASC) 10  mg, oral, Daily    carvedilol (COREG) 25 mg, oral, 2 times daily (morning and late afternoon)    dofetilide (TIKOSYN) 500 mcg, oral, Every 12 hours    Eliquis 5 mg, oral, 2 times daily    Entresto  mg tablet 1 tablet, oral, 2 times daily    tamsulosin (FLOMAX) 0.4 mg, oral, Daily       Objective     Vitals:    05/27/25 1002   Pulse: 64        Vitals and nursing note reviewed.   Constitutional:       Appearance: Healthy appearance.   HENT:    Mouth/Throat:      Pharynx: Oropharynx is clear.   Pulmonary:      Effort: Pulmonary effort is normal.      Breath sounds: Normal breath sounds.   Cardiovascular:      PMI at left midclavicular line. Normal rate. Regular rhythm. Normal S1. Normal S2.       Murmurs: There is a grade 1/6 holosystolic murmur.      No gallop.  No click. No rub.   Pulses:     Intact distal pulses.   Edema:     Peripheral edema absent.   Abdominal:      General: Bowel sounds are normal.   Musculoskeletal:      Cervical back: Normal range of motion. Skin:     General: Skin is warm and dry.   Neurological:      General: No focal deficit present.      Mental Status: Alert and oriented to person, place and time.            Lab Review:   No visits with results within 2 Month(s) from this visit.   Latest known visit with results is:   Orders Only on 01/28/2025   Component Date Value    CHOLESTEROL, TOTAL 01/28/2025 156     HDL CHOLESTEROL 01/28/2025 67     TRIGLYCERIDES 01/28/2025 40     LDL-CHOLESTEROL 01/28/2025 78     CHOL/HDLC RATIO 01/28/2025 2.3     NON HDL CHOLESTEROL 01/28/2025 89     WHITE BLOOD CELL COUNT 01/28/2025 4.8     RED BLOOD CELL COUNT 01/28/2025 4.32     HEMOGLOBIN 01/28/2025 12.8 (L)     HEMATOCRIT 01/28/2025 39.8     MCV 01/28/2025 92.1     MCH 01/28/2025 29.6     MCHC 01/28/2025 32.2     RDW 01/28/2025 13.1     PLATELET COUNT 01/28/2025 201     MPV 01/28/2025 11.4     ABSOLUTE NEUTROPHILS 01/28/2025 2,530     ABSOLUTE LYMPHOCYTES 01/28/2025 1,411     ABSOLUTE MONOCYTES  01/28/2025 562     ABSOLUTE EOSINOPHILS 01/28/2025 259     ABSOLUTE BASOPHILS 01/28/2025 38     NEUTROPHILS 01/28/2025 52.7     LYMPHOCYTES 01/28/2025 29.4     MONOCYTES 01/28/2025 11.7     EOSINOPHILS 01/28/2025 5.4     BASOPHILS 01/28/2025 0.8        [id number] Score  Age 65-74: 1   Sex Male: 0   CHF History No: 0   HTN Yes: 1   Stroke/TIA/Thromboembolism No: 0   Vascular Dz: CAD/PAD/Aortic Plaque No: 0   DM No: 0   Total Score 2        ECG:  Normal sinus rhythm 64 bpm NY interval 210 ms QRS duration 90 ms QTc 466 ms    Assessment/plan:  Atrial fibrillation  No major recurrences maintained on dofetilide and Eliquis.  Continue current regimen.    Problem List Items Addressed This Visit    None  Visit Diagnoses         Paroxysmal atrial fibrillation (Multi)    -  Primary    Relevant Orders    Basic metabolic panel    Magnesium    ECG 12 lead (Clinic Performed)           Rayray Peterson MD

## 2025-05-27 NOTE — ASSESSMENT & PLAN NOTE
Atrial fibrillation  No major recurrences maintained on dofetilide and Eliquis.  Continue current regimen.

## 2025-07-07 NOTE — PROGRESS NOTES
"Subjective   Chief Complaint   Patient presents with    Follow-up     Alcides Tovar is a 75 y.o. male is here today for a follow up 6 month routine. Bilateral feet swelling x 6 weeks.        Patient ID: Alcides Tovar is a 75 y.o. male who presents for Follow-up (Alcides Tovar is a 75 y.o. male is here today for a follow up 6 month routine. Bilateral feet swelling x 6 weeks. ).    HPI  Alcides is a 74 yo est male presenting today for 6 mo f/u   LOV: 1/22/2025    Dx: HTN, Hx Afib, BPH     Current Providers  Specialists: I have reviewed specialist-related care of the patient in the medical record.   CARD: MURALI   ELECTROPHYS: ASHLEIGH    Pts wife passed away in June   He states he is starting to get more active   Denies any CP/SOB/BROWN   Seeing cardiology next week     #HTN/AFIB  Afib dx'd 2021  Followed by cardiology, Dr. Willams  Electrophys: Dr. Peterson (ablation)  Rx: Coreg 25 mg BID, Amlodipine 10 mg daily, Eliquis BID, Entresto BID, Dofetilide BID  had ablation in Aug 2021 and again in April 2024 per Dr. Connelly   BP today= 123/73  LAST ECHO:  4/2024     #BPH  Dx'd 10 yrs ago   Taking Flomax 0.4 mg daily  sx: controlled      #HM  FBW: ordered per cardiology   PSA: 2022  COLON: UTD    RX Allergies[1]    Review of Systems  ROS was completed and all systems are negative with the exception of what was noted in the the HPI.       Objective     Last Recorded Vitals   /73   Pulse 71   Ht 1.905 m (6' 3\")   Wt 106 kg (233 lb 12.8 oz)   SpO2 96%   BMI 29.22 kg/m²      Medications  Current Outpatient Medications   Medication Instructions    amLODIPine (NORVASC) 10 mg, oral, Daily    apixaban (ELIQUIS) 5 mg, oral, 2 times daily    carvedilol (COREG) 25 mg, oral, 2 times daily (morning and late afternoon)    dofetilide (TIKOSYN) 500 mcg, oral, Every 12 hours    sacubitriL-valsartan (Entresto)  mg tablet 1 tablet, oral, 2 times daily    tamsulosin (FLOMAX) 0.4 mg, oral, Daily       Surgical " History[2]      Physical Exam  Vitals reviewed.   Cardiovascular:      Pulses: Normal pulses.      Heart sounds: Normal heart sounds.   Pulmonary:      Effort: Pulmonary effort is normal.      Breath sounds: Normal breath sounds.   Skin:     General: Skin is warm and dry.   Neurological:      Mental Status: He is alert and oriented to person, place, and time.           Assessment & Plan  Primary hypertension  Stable today: 123/73  Continue Amlodipine daily           Chronic diastolic congestive heart failure  Condition stable based on symptoms and exam.    Continue current medications and follow-up at least yearly.         Paroxysmal atrial fibrillation (Multi)  Condition stable based on symptoms and exam.    Continue current medications and follow-up at least yearly.    Orders:    apixaban (Eliquis) 5 mg tablet; Take 1 tablet (5 mg) by mouth 2 times a day.    Longstanding persistent atrial fibrillation (Multi)  Condition stable based on symptoms and exam.    Continue current medications and follow-up at least yearly.       I spent a total of 35 minutes on the date of the service which included preparing to see the patient, face-to-face patient care, completing clinical documentation, obtaining and/or reviewing separately obtained history, performing a medically appropriate examination, counseling and educating the patient/family/caregiver, ordering medications and/or tests, communicating with other HCPs (not separately reported), communicating results to the patient/family/caregiver and care coordination (not separately reported).     Assessment, impression and plan is reflected in the note above as well as the orders.     Plan was discussed with the patient and patient signalled understanding of the plan.              [1] No Known Allergies  [2]   Past Surgical History:  Procedure Laterality Date    CARDIOVERSION  04/08/2024    COLONOSCOPY  05/29/2013    Complete Colonoscopy    OTHER SURGICAL HISTORY  05/29/2013     Stress Test ECG Performed

## 2025-07-08 ENCOUNTER — APPOINTMENT (OUTPATIENT)
Dept: PRIMARY CARE | Facility: CLINIC | Age: 75
End: 2025-07-08
Payer: MEDICARE

## 2025-07-08 VITALS
BODY MASS INDEX: 29.07 KG/M2 | HEART RATE: 71 BPM | WEIGHT: 233.8 LBS | DIASTOLIC BLOOD PRESSURE: 73 MMHG | OXYGEN SATURATION: 96 % | HEIGHT: 75 IN | SYSTOLIC BLOOD PRESSURE: 123 MMHG

## 2025-07-08 DIAGNOSIS — I10 PRIMARY HYPERTENSION: Primary | ICD-10-CM

## 2025-07-08 DIAGNOSIS — I48.11 LONGSTANDING PERSISTENT ATRIAL FIBRILLATION (MULTI): ICD-10-CM

## 2025-07-08 DIAGNOSIS — I50.32 CHRONIC DIASTOLIC CONGESTIVE HEART FAILURE: ICD-10-CM

## 2025-07-08 DIAGNOSIS — I48.0 PAROXYSMAL ATRIAL FIBRILLATION (MULTI): ICD-10-CM

## 2025-07-08 PROCEDURE — 1160F RVW MEDS BY RX/DR IN RCRD: CPT | Performed by: NURSE PRACTITIONER

## 2025-07-08 PROCEDURE — G2211 COMPLEX E/M VISIT ADD ON: HCPCS | Performed by: NURSE PRACTITIONER

## 2025-07-08 PROCEDURE — 1036F TOBACCO NON-USER: CPT | Performed by: NURSE PRACTITIONER

## 2025-07-08 PROCEDURE — 99214 OFFICE O/P EST MOD 30 MIN: CPT | Performed by: NURSE PRACTITIONER

## 2025-07-08 PROCEDURE — 1159F MED LIST DOCD IN RCRD: CPT | Performed by: NURSE PRACTITIONER

## 2025-07-08 PROCEDURE — 3074F SYST BP LT 130 MM HG: CPT | Performed by: NURSE PRACTITIONER

## 2025-07-08 PROCEDURE — 3078F DIAST BP <80 MM HG: CPT | Performed by: NURSE PRACTITIONER

## 2025-07-08 PROCEDURE — 3008F BODY MASS INDEX DOCD: CPT | Performed by: NURSE PRACTITIONER

## 2025-07-08 RX ORDER — DOFETILIDE 0.5 MG/1
500 CAPSULE ORAL EVERY 12 HOURS
Qty: 180 CAPSULE | Refills: 3 | Status: CANCELLED | OUTPATIENT
Start: 2025-07-08 | End: 2026-07-03

## 2025-07-08 RX ORDER — APIXABAN 5 MG/1
5 TABLET, FILM COATED ORAL 2 TIMES DAILY
Qty: 180 TABLET | Refills: 1 | Status: SHIPPED | OUTPATIENT
Start: 2025-07-08 | End: 2025-07-08

## 2025-07-08 RX ORDER — SACUBITRIL AND VALSARTAN 97; 103 MG/1; MG/1
1 TABLET, FILM COATED ORAL 2 TIMES DAILY
Qty: 180 TABLET | Refills: 3 | Status: SHIPPED | OUTPATIENT
Start: 2025-07-08 | End: 2025-07-08

## 2025-07-08 RX ORDER — DOFETILIDE 0.5 MG/1
500 CAPSULE ORAL EVERY 12 HOURS
Qty: 180 CAPSULE | Refills: 0 | Status: CANCELLED | OUTPATIENT
Start: 2025-07-08 | End: 2025-10-06

## 2025-07-08 RX ORDER — SACUBITRIL AND VALSARTAN 97; 103 MG/1; MG/1
1 TABLET, FILM COATED ORAL 2 TIMES DAILY
Qty: 180 TABLET | Refills: 0 | Status: SHIPPED | OUTPATIENT
Start: 2025-07-08 | End: 2025-07-08 | Stop reason: ENTERED-IN-ERROR

## 2025-07-08 RX ORDER — SACUBITRIL AND VALSARTAN 97; 103 MG/1; MG/1
1 TABLET, FILM COATED ORAL 2 TIMES DAILY
COMMUNITY
Start: 2025-07-08

## 2025-07-08 ASSESSMENT — PATIENT HEALTH QUESTIONNAIRE - PHQ9
1. LITTLE INTEREST OR PLEASURE IN DOING THINGS: NOT AT ALL
SUM OF ALL RESPONSES TO PHQ9 QUESTIONS 1 AND 2: 0
2. FEELING DOWN, DEPRESSED OR HOPELESS: NOT AT ALL

## 2025-07-08 NOTE — PATIENT INSTRUCTIONS
Thank you for seeing me today Alcides Tovar, it was a pleasure to see you again!    Continue medications as prescribed    See cardiology as recommended     For assistance with scheduling referrals or consultations, please call [phone] or [phone].    For laboratory, radiology, and other tests, please call [phone] ([phone] for pediatrics).   For laboratory locations, please visit [email or web address]   If you do not get results within 7-10 days, or you have any questions or concerns, please send a message, call the office ([phone]), or return to the office for a follow-up appointment.     For acute/sick visits, if you are unable to get an office visit, you can do a  On Demand Virtual Visit that is accessible via your My Chart account.  For emergencies, call 9-1-1 or go to the nearest Emergency Department.     Please schedule additional appointment(s) to address concern(s) not addressed today.    Please review prescription inserts and published information for possible adverse effects of all medications.     In general, results are discussed over the phone or via  [employer].     You can see your health information, review clinical summaries from office visits & test results online when you follow your health with MY  Chart, a personal health record.   To sign up go to [email or web address].   If you need assistance with signing up or trouble getting into your account call [employer] Patient Line 24/7 at [phone]     RTC 6 MONTHS FOR AWV     ~Jerilyn Calderón NP

## 2025-07-17 ENCOUNTER — APPOINTMENT (OUTPATIENT)
Dept: CARDIOLOGY | Facility: CLINIC | Age: 75
End: 2025-07-17
Payer: MEDICARE

## 2025-07-17 VITALS
OXYGEN SATURATION: 97 % | DIASTOLIC BLOOD PRESSURE: 64 MMHG | HEART RATE: 66 BPM | RESPIRATION RATE: 16 BRPM | WEIGHT: 233 LBS | SYSTOLIC BLOOD PRESSURE: 114 MMHG | BODY MASS INDEX: 29.12 KG/M2

## 2025-07-17 DIAGNOSIS — I50.32 CHRONIC DIASTOLIC CONGESTIVE HEART FAILURE: ICD-10-CM

## 2025-07-17 DIAGNOSIS — I10 PRIMARY HYPERTENSION: ICD-10-CM

## 2025-07-17 DIAGNOSIS — I20.89 OTHER FORMS OF ANGINA PECTORIS: ICD-10-CM

## 2025-07-17 DIAGNOSIS — I48.0 PAROXYSMAL ATRIAL FIBRILLATION (MULTI): ICD-10-CM

## 2025-07-17 DIAGNOSIS — Z86.79 HISTORY OF ATRIAL FIBRILLATION: ICD-10-CM

## 2025-07-17 DIAGNOSIS — I42.0 DILATED CARDIOMYOPATHY (MULTI): Primary | ICD-10-CM

## 2025-07-17 PROCEDURE — 3078F DIAST BP <80 MM HG: CPT | Performed by: INTERNAL MEDICINE

## 2025-07-17 PROCEDURE — 1126F AMNT PAIN NOTED NONE PRSNT: CPT | Performed by: INTERNAL MEDICINE

## 2025-07-17 PROCEDURE — 1160F RVW MEDS BY RX/DR IN RCRD: CPT | Performed by: INTERNAL MEDICINE

## 2025-07-17 PROCEDURE — 1159F MED LIST DOCD IN RCRD: CPT | Performed by: INTERNAL MEDICINE

## 2025-07-17 PROCEDURE — 3074F SYST BP LT 130 MM HG: CPT | Performed by: INTERNAL MEDICINE

## 2025-07-17 PROCEDURE — 99214 OFFICE O/P EST MOD 30 MIN: CPT | Performed by: INTERNAL MEDICINE

## 2025-07-17 RX ORDER — SACUBITRIL AND VALSARTAN 97; 103 MG/1; MG/1
1 TABLET, FILM COATED ORAL 2 TIMES DAILY
Qty: 180 TABLET | Refills: 3 | Status: SHIPPED | OUTPATIENT
Start: 2025-07-17

## 2025-07-17 ASSESSMENT — LIFESTYLE VARIABLES
HOW MANY STANDARD DRINKS CONTAINING ALCOHOL DO YOU HAVE ON A TYPICAL DAY: 1 OR 2
SKIP TO QUESTIONS 9-10: 1
HOW OFTEN DO YOU HAVE SIX OR MORE DRINKS ON ONE OCCASION: NEVER
HOW OFTEN DO YOU HAVE A DRINK CONTAINING ALCOHOL: 2-4 TIMES A MONTH
AUDIT-C TOTAL SCORE: 2
AUDIT TOTAL SCORE: 2
HAVE YOU OR SOMEONE ELSE BEEN INJURED AS A RESULT OF YOUR DRINKING: NO
HAS A RELATIVE, FRIEND, DOCTOR, OR ANOTHER HEALTH PROFESSIONAL EXPRESSED CONCERN ABOUT YOUR DRINKING OR SUGGESTED YOU CUT DOWN: NO

## 2025-07-17 ASSESSMENT — PAIN SCALES - GENERAL: PAINLEVEL_OUTOF10: 0-NO PAIN

## 2025-07-17 ASSESSMENT — ENCOUNTER SYMPTOMS
LOSS OF SENSATION IN FEET: 0
OCCASIONAL FEELINGS OF UNSTEADINESS: 0
DEPRESSION: 0

## 2025-07-17 NOTE — PROGRESS NOTES
[facility] [place]    Interventional Cardiology    History of present illness:  Patient with history of heart failure diastolic dysfunction, paroxysmal atrial fibrillation on oral anticoagulation status post ablation on Tikosyn.  Patient returns to my office for follow-up.  Feeling overall okay.  Denies any chest pain or shortness of breath.  No palpitations dizziness lightheadedness or syncope.  Saw Dr. Peterson yesterday and his EKG was okay.  Echocardiogram in April 2024 showed ejection fraction of 55 to 60% without major valve abnormalities.       Medical History[1]    Surgical History[2]    Allergies[3]     reports that he has never smoked. He has never been exposed to tobacco smoke. He has never used smokeless tobacco. He reports current alcohol use of about 1.0 standard drink of alcohol per week. He reports that he does not use drugs.    Family History[4]    Patient's Medications   New Prescriptions    No medications on file   Previous Medications    AMLODIPINE (NORVASC) 10 MG TABLET    Take 1 tablet (10 mg) by mouth once daily.    APIXABAN (ELIQUIS) 5 MG TABLET    Take 1 tablet (5 mg) by mouth 2 times a day.    CARVEDILOL (COREG) 25 MG TABLET    Take 1 tablet (25 mg) by mouth 2 times daily (morning and late afternoon).    DOFETILIDE (TIKOSYN) 500 MCG CAPSULE    Take 1 capsule (500 mcg) by mouth every 12 hours.    SACUBITRIL-VALSARTAN (ENTRESTO)  MG TABLET    Take 1 tablet by mouth 2 times a day.    TAMSULOSIN (FLOMAX) 0.4 MG 24 HR CAPSULE    Take 1 capsule (0.4 mg) by mouth once daily.   Modified Medications    No medications on file   Discontinued Medications    No medications on file       Objective   Physical Exam  General: Patient in no acute distress   HEENT: Atraumatic normocephalic.  Neck: Supple, jugular venous pressure within normal limit.  No bruits  Lungs: Clear to auscultation bilaterally  Cardiovascular: Regular rate and rhythm, normal heart  sounds, no murmurs rubs or gallops  Abdomen: Soft nontender nondistended.  Normal bowel sounds.  Extremities: Warm to touch, no edema.    Lab Review   Office Visit on 05/27/2025   Component Date Value    Ventricular Rate 05/27/2025 64     Atrial Rate 05/27/2025 64     NC Interval 05/27/2025 210     QRS Duration 05/27/2025 90     QT Interval 05/27/2025 452     QTC Calculation(Bazett) 05/27/2025 466     P Axis 05/27/2025 81     R Axis 05/27/2025 52     T Axis 05/27/2025 33     QRS Count 05/27/2025 11     Q Onset 05/27/2025 218     P Onset 05/27/2025 113     P Offset 05/27/2025 174     T Offset 05/27/2025 444     QTC Fredericia 05/27/2025 461         Assessment/Plan   Problem List[5]     Patient with history of heart failure diastolic dysfunction, paroxysmal atrial fibrillation on oral anticoagulation status post ablation on Tikosyn.  Patient returns to my office for follow-up.  Feeling overall okay.  Denies any chest pain or shortness of breath.  No palpitations dizziness lightheadedness or syncope.  Saw Dr. Peterson yesterday and his EKG was okay.  Echocardiogram in April 2024 showed ejection fraction of 55 to 60% without major valve abnormalities.     At this point my recommendation is to continue current medications.  Discussed with patient lifestyle modification.  Blood work reviewed.  LDL at target.  I will continue current medications.  Potassium and renal function has been stable.  Blood pressure and heart rate well-controlled.  Will follow-up in 12 months.    Cammie Willams MD              [1]   Past Medical History:  Diagnosis Date    Atrial fibrillation (Multi) 06/21/2023    Hx of ablation    BPH (benign prostatic hyperplasia)     Cardiomyopathy 01/30/2024    Cammie Willams MD  Physician : Cardiology    Chronic anticoagulation     Eliquis    Chronic diastolic congestive heart failure 06/21/2023    Hypertension 06/21/2023    Other forms of angina pectoris 06/21/2023    Palpitations 01/30/2024    Personal  history of other diseases of the circulatory system 12/08/2022    History of atrial fibrillation    Personal history of other endocrine, nutritional and metabolic disease 11/17/2019    History of hyperglycemia    Screening for colorectal cancer 03/21/2024    Syncope 01/30/2024   [2]   Past Surgical History:  Procedure Laterality Date    CARDIOVERSION  04/08/2024    COLONOSCOPY  05/29/2013    Complete Colonoscopy    OTHER SURGICAL HISTORY  05/29/2013    Stress Test ECG Performed   [3] No Known Allergies  [4]   Family History  Problem Relation Name Age of Onset    Lymphoma Mother      Sudden death Father  44        heart attack   [5]   Patient Active Problem List  Diagnosis    Chronic diastolic congestive heart failure    Enlarged prostate without lower urinary tract symptoms (luts)    Hypertension    Prostatitis syndrome    Renal cyst, acquired, left    Other forms of angina pectoris    Cardiomyopathy    Excessive cerumen in ear canal    History of atrial fibrillation    Advanced directives, counseling/discussion    Screening for multiple conditions    Medicare annual wellness visit, subsequent

## 2025-07-28 LAB
ANION GAP SERPL CALCULATED.4IONS-SCNC: 10 MMOL/L (CALC) (ref 7–17)
BUN SERPL-MCNC: 12 MG/DL (ref 7–25)
BUN/CREAT SERPL: ABNORMAL (CALC) (ref 6–22)
CALCIUM SERPL-MCNC: 8.9 MG/DL (ref 8.6–10.3)
CHLORIDE SERPL-SCNC: 105 MMOL/L (ref 98–110)
CO2 SERPL-SCNC: 25 MMOL/L (ref 20–32)
CREAT SERPL-MCNC: 1 MG/DL (ref 0.7–1.28)
EGFRCR SERPLBLD CKD-EPI 2021: 79 ML/MIN/1.73M2
GLUCOSE SERPL-MCNC: 116 MG/DL (ref 65–99)
MAGNESIUM SERPL-MCNC: 2.2 MG/DL (ref 1.5–2.5)
POTASSIUM SERPL-SCNC: 4.5 MMOL/L (ref 3.5–5.3)
SODIUM SERPL-SCNC: 140 MMOL/L (ref 135–146)

## 2025-08-02 DIAGNOSIS — I48.0 PAROXYSMAL ATRIAL FIBRILLATION (MULTI): ICD-10-CM

## 2025-08-04 RX ORDER — APIXABAN 5 MG/1
5 TABLET, FILM COATED ORAL 2 TIMES DAILY
Qty: 180 TABLET | Refills: 1 | Status: SHIPPED | OUTPATIENT
Start: 2025-08-04

## 2025-08-14 DIAGNOSIS — I48.11 LONGSTANDING PERSISTENT ATRIAL FIBRILLATION (MULTI): ICD-10-CM

## 2025-08-14 RX ORDER — DOFETILIDE 0.5 MG/1
500 CAPSULE ORAL EVERY 12 HOURS
Qty: 180 CAPSULE | Refills: 3 | Status: SHIPPED | OUTPATIENT
Start: 2025-08-14

## 2026-01-08 ENCOUNTER — APPOINTMENT (OUTPATIENT)
Dept: PRIMARY CARE | Facility: CLINIC | Age: 76
End: 2026-01-08
Payer: MEDICARE